# Patient Record
Sex: MALE | Race: WHITE | NOT HISPANIC OR LATINO | Employment: FULL TIME | URBAN - METROPOLITAN AREA
[De-identification: names, ages, dates, MRNs, and addresses within clinical notes are randomized per-mention and may not be internally consistent; named-entity substitution may affect disease eponyms.]

---

## 2017-05-24 ENCOUNTER — TRANSFERRED RECORDS (OUTPATIENT)
Dept: HEALTH INFORMATION MANAGEMENT | Facility: CLINIC | Age: 42
End: 2017-05-24

## 2017-05-24 DIAGNOSIS — G50.0 TRIGEMINAL NEURALGIA: Primary | ICD-10-CM

## 2017-05-24 NOTE — NURSING NOTE
Lab called as patient was there to day to have repeat lab work completed. Faxed order for CBC and CMP. Patient on Trileptal and in need of labs every 3 months.     Demi Saleh RN

## 2017-05-31 ENCOUNTER — TELEPHONE (OUTPATIENT)
Dept: NEUROSURGERY | Facility: CLINIC | Age: 42
End: 2017-05-31

## 2017-05-31 DIAGNOSIS — G50.0 TRIGEMINAL NEURALGIA: Primary | ICD-10-CM

## 2017-05-31 NOTE — TELEPHONE ENCOUNTER
Received patient's lab work from his PCP office. Patient's sodium is a little low at 134. Reviewed with Aggie Bermudez NP and she would like patient to add salt to his diet and repeat labs in 1 month. Message left for patient to let him know of plan and he was encouraged to call with questions or concerns. Faxed lab orders to [facility] and patient was advised to call and arrange appointment. Writer's direct line was left for patient and he was encouraged to call with further questions, concerns or new/worsenings symptoms.     Demi Saleh RN

## 2017-06-30 DIAGNOSIS — G50.0 TRIGEMINAL NEURALGIA: Primary | ICD-10-CM

## 2017-07-07 ENCOUNTER — TRANSFERRED RECORDS (OUTPATIENT)
Dept: HEALTH INFORMATION MANAGEMENT | Facility: CLINIC | Age: 42
End: 2017-07-07

## 2017-07-11 ENCOUNTER — TELEPHONE (OUTPATIENT)
Dept: NEUROSURGERY | Facility: CLINIC | Age: 42
End: 2017-07-11

## 2017-07-11 NOTE — TELEPHONE ENCOUNTER
TELEPHONE FOLLOWUP    I have had a chance to speak with Mr. Kramer today in further followup of his left-sided trigeminal neuralgia (TN) and the recent lab results.     The patient has been taking Trileptal for TN and requires routine monitor of his CMP and CBC. He also has a known history of Carbatrol/ Trileptal induced hyponatremia. His last Na level in 5/2017 was 134. He was asymptomatic from this and we therefore advised him to increase salt intake i.e.adding more salt in foods. The plan was to repeat CMP and CBC in 7/2017. He however did not want to repeat the CBC this time, because of concern of cost. His new Na level as of 7/2017 is 129.    The patient admits today that he has increased Trileptal from 300 mg BID to 600 mg BID since his last lab test in 5/2017 due to recurrent facial pain with triggers (i.e. eating). He was pain free for 6-7 months prior to that. He still has pain with triggers, but not intolerable. He denies n/v, seizures, headache, muscle weakness, cramp, spasm, restlessness, irritability, and confusion except for lack of energy. He felt that his pain is under acceptable control with the medications. He is therefore not interested in pursuing surgical intervention at this time.    We had a terrance discussion about decreasing his Trileptal down to 300 mg BID due to hyponatremia, a common side effect from Trileptal. He is already taking Neurontin 900 mg BID. I will re-adjust his medication schedule with the decrease in Trileptal. I would like to repeat his CMP and CBC in 3-4 weeks. I told him that if the pain is not under control and given his hx of recurrent Trileptal induced hyponatremia, we may need to discontinue Trileptal and try increasing Neurontin instead. He wants to know if there are other medications to take. I told him that since he did not respond to Amitriptyline in the past, we are limited in medical option.  I informed him that Trileptal and Tegretol are typically the first  line drug of treatment for TN. Alternatively, surgery is another good option.    We have not seen him since 10/2016. I would like to see him back in 10/2017 for a follow up if is to continue to have our office manage his TN. He agreed. I  will have our RN coordinator send him the medication schedule and follow up on his status in a few days over the phone. I also told him not to hesitate to call us if he has concerns or questions.

## 2017-07-11 NOTE — PATIENT INSTRUCTIONS
1. Go to local lab for a set of CMP and CBC in 3-4 weeks. Please coordinate with our clinic RN to have this done locall.  2. Follow the new medication schedule. Call our office if you facial pain is not under control  [facility]                                             Time  Drug 6 am 10 am 2 pm 6 pm 10 pm   Oxycarbazepine (Trileptal)  150 mg/pill  2  2    Gabapentin (Neurontin) 300 mg/pill 2  2  2     3. Schedule a follow up appointment with CONRADO Bermudez at the end of 10/2017.

## 2017-07-21 ENCOUNTER — TELEPHONE (OUTPATIENT)
Dept: NEUROSURGERY | Facility: CLINIC | Age: 42
End: 2017-07-21

## 2017-07-21 NOTE — TELEPHONE ENCOUNTER
Called patient to check in to see how he is doing with his new medication schedule given to him by Jeffrey Bermudez DNP. Patient states that he is doing well. He continues to not have any facial pain. He will follow-up with PCP in 2 weeks for repeat lab work. Patient was advised to call with further questions or concerns. He will follow-up with Aggie Bermudez DNP in October of 2017.  Message sent to schedulers to call patient to arrange.     Demi Saleh RN

## 2017-08-10 ENCOUNTER — TRANSFERRED RECORDS (OUTPATIENT)
Dept: HEALTH INFORMATION MANAGEMENT | Facility: CLINIC | Age: 42
End: 2017-08-10

## 2017-08-11 ENCOUNTER — TELEPHONE (OUTPATIENT)
Dept: NEUROSURGERY | Facility: CLINIC | Age: 42
End: 2017-08-11

## 2017-08-11 NOTE — TELEPHONE ENCOUNTER
Called patient to let him know that we received his lab work from his PCP office. His sodium is now normal at 137. Patient states that he is doing well with his facial pain with the new plan given to him by Aggie Bermudez DNP. He will plan to follow-up in clinic on 10/18 with Dr. Brooks. He will repeat his lab work that morning prior to his appointment. Patient was encouraged to call sooner with further questions or concerns.       Demi Saleh RN

## 2017-09-28 DIAGNOSIS — G50.0 TRIGEMINAL NEURALGIA: ICD-10-CM

## 2017-09-28 RX ORDER — OXCARBAZEPINE 150 MG/1
300 TABLET, FILM COATED ORAL 2 TIMES DAILY
Qty: 120 TABLET | Refills: 2 | Status: SHIPPED | OUTPATIENT
Start: 2017-09-28 | End: 2017-09-28

## 2017-09-28 RX ORDER — OXCARBAZEPINE 150 MG/1
300 TABLET, FILM COATED ORAL 2 TIMES DAILY
Qty: 120 TABLET | Refills: 2 | Status: SHIPPED | OUTPATIENT
Start: 2017-09-28 | End: 2017-11-29

## 2017-09-28 RX ORDER — GABAPENTIN 300 MG/1
900 CAPSULE ORAL 3 TIMES DAILY
Qty: 810 CAPSULE | Refills: 2 | Status: SHIPPED | OUTPATIENT
Start: 2017-09-28 | End: 2018-04-11

## 2017-10-18 ENCOUNTER — OFFICE VISIT (OUTPATIENT)
Dept: NEUROSURGERY | Facility: CLINIC | Age: 42
End: 2017-10-18

## 2017-10-18 VITALS — HEART RATE: 65 BPM | HEIGHT: 70 IN | SYSTOLIC BLOOD PRESSURE: 121 MMHG | DIASTOLIC BLOOD PRESSURE: 78 MMHG

## 2017-10-18 DIAGNOSIS — G50.0 TRIGEMINAL NEURALGIA: Primary | ICD-10-CM

## 2017-10-18 DIAGNOSIS — G50.0 TRIGEMINAL NEURALGIA: ICD-10-CM

## 2017-10-18 LAB
ALBUMIN SERPL-MCNC: 3.7 G/DL (ref 3.4–5)
ALP SERPL-CCNC: 85 U/L (ref 40–150)
ALT SERPL W P-5'-P-CCNC: 24 U/L (ref 0–70)
ANION GAP SERPL CALCULATED.3IONS-SCNC: 4 MMOL/L (ref 3–14)
AST SERPL W P-5'-P-CCNC: 16 U/L (ref 0–45)
BASOPHILS # BLD AUTO: 0.1 10E9/L (ref 0–0.2)
BASOPHILS NFR BLD AUTO: 0.8 %
BILIRUB SERPL-MCNC: 0.3 MG/DL (ref 0.2–1.3)
BUN SERPL-MCNC: 13 MG/DL (ref 7–30)
CALCIUM SERPL-MCNC: 8.7 MG/DL (ref 8.5–10.1)
CHLORIDE SERPL-SCNC: 101 MMOL/L (ref 94–109)
CO2 SERPL-SCNC: 30 MMOL/L (ref 20–32)
CREAT SERPL-MCNC: 0.95 MG/DL (ref 0.66–1.25)
DIFFERENTIAL METHOD BLD: NORMAL
EOSINOPHIL # BLD AUTO: 0.7 10E9/L (ref 0–0.7)
EOSINOPHIL NFR BLD AUTO: 10.2 %
ERYTHROCYTE [DISTWIDTH] IN BLOOD BY AUTOMATED COUNT: 11.8 % (ref 10–15)
GFR SERPL CREATININE-BSD FRML MDRD: 87 ML/MIN/1.7M2
GLUCOSE SERPL-MCNC: 60 MG/DL (ref 70–99)
HCT VFR BLD AUTO: 47.4 % (ref 40–53)
HGB BLD-MCNC: 16.8 G/DL (ref 13.3–17.7)
IMM GRANULOCYTES # BLD: 0 10E9/L (ref 0–0.4)
IMM GRANULOCYTES NFR BLD: 0.6 %
LYMPHOCYTES # BLD AUTO: 1.4 10E9/L (ref 0.8–5.3)
LYMPHOCYTES NFR BLD AUTO: 22.2 %
MCH RBC QN AUTO: 31.5 PG (ref 26.5–33)
MCHC RBC AUTO-ENTMCNC: 35.4 G/DL (ref 31.5–36.5)
MCV RBC AUTO: 89 FL (ref 78–100)
MONOCYTES # BLD AUTO: 0.6 10E9/L (ref 0–1.3)
MONOCYTES NFR BLD AUTO: 9.1 %
NEUTROPHILS # BLD AUTO: 3.6 10E9/L (ref 1.6–8.3)
NEUTROPHILS NFR BLD AUTO: 57.1 %
NRBC # BLD AUTO: 0 10*3/UL
NRBC BLD AUTO-RTO: 0 /100
PLATELET # BLD AUTO: 289 10E9/L (ref 150–450)
POTASSIUM SERPL-SCNC: 4.9 MMOL/L (ref 3.4–5.3)
PROT SERPL-MCNC: 7.5 G/DL (ref 6.8–8.8)
RBC # BLD AUTO: 5.34 10E12/L (ref 4.4–5.9)
SODIUM SERPL-SCNC: 136 MMOL/L (ref 133–144)
WBC # BLD AUTO: 6.4 10E9/L (ref 4–11)

## 2017-10-18 ASSESSMENT — PAIN SCALES - GENERAL: PAINLEVEL: SEVERE PAIN (7)

## 2017-10-18 NOTE — LETTER
10/18/2017       RE: Hong Kramer  [address] [address] [place] [id number]     Dear Colleague,    Thank you for referring your patient, Hong Kramer, to the [facility] NEUROSURGERY at [facility]. Please see a copy of my visit note below.    NEUROSURGERY PROGRESS NOTE      REASON FOR VISIT:    Chief Complaint   Patient presents with     Facial Pain     Annual follow up of left-sided trigeminal neuralgia        HISTORY OF PRESENT ILLNESS:  Mr. Kramer is a 42 year old male with significant history of left-sided V2 trigeminal neuralgia, which he elected to be manage medically by our service since 2014. He was last seen in our office in October 2016 where his trigeminal neuralgia was under relatively good control with a combination gabapentin and Trileptal. The primary concern however was the Trileptal associated hyponatremia for which we have instructed him to decrease the dosage at times. The patient was doing well until about 2 month ago when he started to have developed intermittent burning and twinges in the left periauricular area and the left chin with occasional electrical shock like attack in the left posterior upper molar whenever he chews, talks, and brushes his left upper teeth. He denies constant background aching pain, trigeminal neuralgia in the V1 distribution and/or in the contralateral side of his face. He said he went to see an endodontist recently, who thought that he might have a problematic root canal that could be repaired, but it was unclear if this was the cause of his pain. He is currently taking gabapentin 1200 mg b.i.d. and Trileptal 300 mg b.i.d. without adequate pain relief. He denies any side effects from medications. He completed routine CBC with differential and CMP prior to the visit today. His sodium is back up to 136 and the remaining CMP and CBC results are normal.    INTERVAL HEALTH HISTORY:  History reviewed. No pertinent past medical history.  "    CURRENT MEDICATIONS:  Current Outpatient Prescriptions   Medication Sig Dispense Refill     gabapentin (NEURONTIN) 300 MG capsule Take 3 capsules (900 mg) by mouth 3 times daily (Patient taking differently: Take 1,200 mg by mouth 2 times daily 4 tabs TID) 810 capsule 2     OXcarbazepine (TRILEPTAL) 150 MG tablet Take 2 tablets (300 mg) by mouth 2 times daily 120 tablet 2     ALLERGIES:  Review of patient's allergies indicates no known allergies.    REVIEW OF SYSTEMS:  10 point ROS of systems including Constitutional, Eyes, Respiratory, Cardiovascular, Gastroenterology, Genitourinary, Integumentary, Musculoskeletal, Psychiatric were all negative except for pertinent positives noted in my HPI.    PHYSICAL EXAMINATION:    Filed Vitals:  /78  Pulse 65  Ht 1.778 m (5' 10\")     Patient Supplied Answers To the  Pain Questionnaire  [facility] Pain -  Patient Entered Questionnaire/Answers 10/18/2017   What number best describes your pain right now:  0 = No pain  to  10 = Worst pain imaginable 1   How would you describe the pain? burning, sharp   Which of the following worsen your pain? -   Which of the following improve or reduce your pain?  -   What number best describes your average pain for the past week:  0 = No pain  to  10 = Worst pain imaginable 3   What number best describes your LOWEST pain in past 24 hours:  0 = No pain  to  10 = Worst pain imaginable 0   What number best describes your WORST pain in past 24 hours:  0 = No pain  to  10 = Worst pain imaginable 4   When is your pain worst? AM   What non-medicine treatments have you already had for your pain? -   Have you tried treating your pain with medication?  Yes   Are you currently taking medications for your pain? Yes     Appearance: This is a well-nourished and well-developed male in no acute distress.  Head and neck: Normocephalic and atraumatic. Neck is supple without thyromegaly or adenopathy.  Lungs: Clear to auscultation.  Cardiovascular: Heart rate " is regular with S1 and S2, no extra heart sound.  Abdomen: Soft, positive bowel sounds ×4 quadrants.  LOC and Cognition:  he is alert and oriented to person, place, and time. He answers questions and follows commands appropriately in fluent speech and normal tone. Memory and fund of knowledge are appropriate for age.  Cranial Nerves:   Smell is intact and symmetric. Pupils 3 mm react briskly and symmetric. Vision is grossly intact. Peripheral fields is intact. Extraocular movements conjugate and full, no ptosis. Bilateral corneal reflex is intact. Masseter and pterygoid muscle strength are normal, Facial sensation and movement are intact and symmetric. Hears finger rubs in both ears. Palate elevates is symmetric. Swallow and voice quality are unremarkable. Shoulders appear symmetric. Shoulder shrugs and SCM's are strong and symmetric. Midline tongue with normal movements.    LABS AND IMAGING:  All laboratory data reviewed  Lab Results   Component Value Date    WBC 6.4 10/18/2017    HGB 16.8 10/18/2017    HCT 47.4 10/18/2017     10/18/2017     10/18/2017    POTASSIUM 4.9 10/18/2017    CHLORIDE 101 10/18/2017    CO2 30 10/18/2017    BUN 13 10/18/2017    CR 0.95 10/18/2017    GLC 60 (L) 10/18/2017    AST 16 10/18/2017    ALT 24 10/18/2017    ALKPHOS 85 10/18/2017    BILITOTAL 0.3 10/18/2017     ASSESSMENT AND RECOMMENDATIONS: This is a 42-year-old male with a history of left-sided V2 and V3 trigeminal neuralgia, which is not under control by medications currently. We briefly reviewed the surgical options with him. He states he is not ready to pursue any surgical intervention at the present time.    We recommend him to increase the Trileptal to 450 mg b.i.d. and continue to keep the same dosage of gabapentin. We will also continue to follow his lab, CMP and CBC with differential, routinely for as well as he remains on Trileptal. He will plan to get this done in [place] where he lives and have the local  lab fax the results to us for review. We will plan to see him back in a year for another follow-up. We told him not to hesitate to call us if he has any concerns and/or questions regarding his medication and trigeminal neuralgia.    Thank you very much for allowing us to participate in the care of this patient. Please do not hesitate to contact us with questions. We will keep you informed of his progress.     The patient was seen in conjunction with Dr. Cedric Brooks. Dr. Brooks review the history, examined the patient, and jointly with the plan of care.     I have personally reviewed the history and images, examined the patient and discussed the findings with Ms. Bermudez and the patient, reviewed and edited Ms. Bermudez's note and agree with the plan of care. - TIFFANY Brooks M.D., Professor  Aggie Bermudez, DNP, APRN, FNP-[facility]   Department of Neurosurgery  Phone: [phone]  Fax: [phone]

## 2017-10-18 NOTE — MR AVS SNAPSHOT
After Visit Summary   10/18/2017    Hong Kramer    MRN: [id number]           Patient Information     Date Of Birth          1975        Visit Information        Provider Department      10/18/2017 9:30 AM Cedric Brooks MD [facility]        Care Instructions    1, Schedule annual followup with Dr. Brooks (10/2018)  2. May increase Trileptal to 450 mg (2.5 pills) twice per day.  2. Needs to do routine labs (Sodium, ALT, AST, Alkaline phosphatase, and WBC at least) every 3 months.  3. Call [phone] for concerns and questions.          Follow-ups after your visit        Follow-up notes from your care team     Return in about 1 year (around 10/18/2018).      Who to contact     Please call your clinic at [phone] to:    Ask questions about your health    Make or cancel appointments    Discuss your medicines    Learn about your test results    Speak to your doctor   If you have compliments or concerns about an experience at your clinic, or if you wish to file a complaint, please contact [facility] Patient Relations at [phone] or email us at [email or web address]         Additional Information About Your Visit        MyChart Information     ZEFR gives you secure access to your electronic health record. If you see a primary care provider, you can also send messages to your care team and make appointments. If you have questions, please call your primary care clinic.  If you do not have a primary care provider, please call [phone] and they will assist you.      ZEFR is an electronic gateway that provides easy, online access to your medical records. With ZEFR, you can request a clinic appointment, read your test results, renew a prescription or communicate with your care team.     To access your existing account, please contact your [facility] or call [phone] for assistance.        Care  "EveryWhere ID     This is your Care EveryWhere ID. This could be used by other organizations to access your [facility] medical records  [id number]        Your Vitals Were     Pulse Height                65 1.778 m (5' 10\")           Blood Pressure from Last 3 Encounters:   10/18/17 121/78   10/26/16 127/83   05/07/14 126/86    Weight from Last 3 Encounters:   10/26/16 93.9 kg (207 lb 1.6 oz)   05/07/14 91.6 kg (202 lb)              Today, you had the following     No orders found for display         Today's Medication Changes          These changes are accurate as of: 10/18/17 10:27 AM.  If you have any questions, ask your nurse or doctor.               These medicines have changed or have updated prescriptions.        Dose/Directions    gabapentin 300 MG capsule   Commonly known as:  NEURONTIN   This may have changed:    - how much to take  - when to take this  - additional instructions   Used for:  Trigeminal neuralgia        Dose:  900 mg   Take 3 capsules (900 mg) by mouth 3 times daily   Quantity:  810 capsule   Refills:  2                Primary Care Provider Fax #    Laureano Kelly [phone]       [facility] [phone] [facility] [address]        Equal Access to Services     STEPHANIE JACKSON AH: Hadii alex Vee, waaxda luqliz, qaybta kaalmada adesohayada, kale rogel. So [facility] [phone].    ATENCIÓN: Si habla español, tiene a falcon disposición servicios gratuitos de asistencia lingüística. Llame al [phone].    We comply with applicable federal civil rights laws and [place] laws. We do not discriminate on the basis of race, color, national origin, age, disability, sex, sexual orientation, or gender identity.            Thank you!     Thank you for choosing [facility] NEUROSURGERY  for your care. Our goal is always to provide you with excellent care. Hearing back from our patients is one way we can continue to improve our services. Please take a " few minutes to complete the written survey that you may receive in the mail after your visit with us. Thank you!             Your Updated Medication List - Protect others around you: Learn how to safely use, store and throw away your medicines at www.disposemymeds.org.          This list is accurate as of: 10/18/17 10:27 AM.  Always use your most recent med list.                   Brand Name Dispense Instructions for use Diagnosis    gabapentin 300 MG capsule    NEURONTIN    810 capsule    Take 3 capsules (900 mg) by mouth 3 times daily    Trigeminal neuralgia       OXcarbazepine 150 MG tablet    TRILEPTAL    120 tablet    Take 2 tablets (300 mg) by mouth 2 times daily    Trigeminal neuralgia

## 2017-10-18 NOTE — PATIENT INSTRUCTIONS
1, Schedule annual followup with Dr. Brooks (10/2018)  2. May increase Trileptal to 450 mg (2.5 pills) twice per day.  2. Needs to do routine labs (Sodium, ALT, AST, Alkaline phosphatase, and WBC at least) every 3 months.  3. Call [phone] for concerns and questions.

## 2017-10-18 NOTE — NURSING NOTE
Chief Complaint   Patient presents with     RECHECK     Yearly follow up/ Trigeminal Neuralgia     Hossein Isaac CMA

## 2017-10-19 NOTE — PROGRESS NOTES
NEUROSURGERY PROGRESS NOTE      REASON FOR VISIT:    Chief Complaint   Patient presents with     Facial Pain     Annual follow up of left-sided trigeminal neuralgia        HISTORY OF PRESENT ILLNESS:  Mr. Kramer is a 42 year old male with significant history of left-sided V2 trigeminal neuralgia, which he elected to be manage medically by our service since 2014. He was last seen in our office in October 2016 where his trigeminal neuralgia was under relatively good control with a combination gabapentin and Trileptal. The primary concern however was the Trileptal associated hyponatremia for which we have instructed him to decrease the dosage at times. The patient was doing well until about 2 month ago when he started to have developed intermittent burning and twinges in the left periauricular area and the left chin with occasional electrical shock like attack in the left posterior upper molar whenever he chews, talks, and brushes his left upper teeth. He denies constant background aching pain, trigeminal neuralgia in the V1 distribution and/or in the contralateral side of his face. He said he went to see an endodontist recently, who thought that he might have a problematic root canal that could be repaired, but it was unclear if this was the cause of his pain. He is currently taking gabapentin 1200 mg b.i.d. and Trileptal 300 mg b.i.d. without adequate pain relief. He denies any side effects from medications. He completed routine CBC with differential and CMP prior to the visit today. His sodium is back up to 136 and the remaining CMP and CBC results are normal.    INTERVAL HEALTH HISTORY:  History reviewed. No pertinent past medical history.     CURRENT MEDICATIONS:  Current Outpatient Prescriptions   Medication Sig Dispense Refill     gabapentin (NEURONTIN) 300 MG capsule Take 3 capsules (900 mg) by mouth 3 times daily (Patient taking differently: Take 1,200 mg by mouth 2 times daily 4 tabs TID) 810 capsule 2      "OXcarbazepine (TRILEPTAL) 150 MG tablet Take 2 tablets (300 mg) by mouth 2 times daily 120 tablet 2     ALLERGIES:  Review of patient's allergies indicates no known allergies.    REVIEW OF SYSTEMS:  10 point ROS of systems including Constitutional, Eyes, Respiratory, Cardiovascular, Gastroenterology, Genitourinary, Integumentary, Musculoskeletal, Psychiatric were all negative except for pertinent positives noted in my HPI.    PHYSICAL EXAMINATION:    Filed Vitals:  /78  Pulse 65  Ht 1.778 m (5' 10\")     Patient Supplied Answers To the  Pain Questionnaire  [facility] Pain -  Patient Entered Questionnaire/Answers 10/18/2017   What number best describes your pain right now:  0 = No pain  to  10 = Worst pain imaginable 1   How would you describe the pain? burning, sharp   Which of the following worsen your pain? -   Which of the following improve or reduce your pain?  -   What number best describes your average pain for the past week:  0 = No pain  to  10 = Worst pain imaginable 3   What number best describes your LOWEST pain in past 24 hours:  0 = No pain  to  10 = Worst pain imaginable 0   What number best describes your WORST pain in past 24 hours:  0 = No pain  to  10 = Worst pain imaginable 4   When is your pain worst? AM   What non-medicine treatments have you already had for your pain? -   Have you tried treating your pain with medication?  Yes   Are you currently taking medications for your pain? Yes     Appearance: This is a well-nourished and well-developed male in no acute distress.  Head and neck: Normocephalic and atraumatic. Neck is supple without thyromegaly or adenopathy.  Lungs: Clear to auscultation.  Cardiovascular: Heart rate is regular with S1 and S2, no extra heart sound.  Abdomen: Soft, positive bowel sounds ×4 quadrants.  LOC and Cognition:  he is alert and oriented to person, place, and time. He answers questions and follows commands appropriately in fluent speech and normal tone. Memory and " fund of knowledge are appropriate for age.  Cranial Nerves:   Smell is intact and symmetric. Pupils 3 mm react briskly and symmetric. Vision is grossly intact. Peripheral fields is intact. Extraocular movements conjugate and full, no ptosis. Bilateral corneal reflex is intact. Masseter and pterygoid muscle strength are normal, Facial sensation and movement are intact and symmetric. Hears finger rubs in both ears. Palate elevates is symmetric. Swallow and voice quality are unremarkable. Shoulders appear symmetric. Shoulder shrugs and SCM's are strong and symmetric. Midline tongue with normal movements.    LABS AND IMAGING:  All laboratory data reviewed  Lab Results   Component Value Date    WBC 6.4 10/18/2017    HGB 16.8 10/18/2017    HCT 47.4 10/18/2017     10/18/2017     10/18/2017    POTASSIUM 4.9 10/18/2017    CHLORIDE 101 10/18/2017    CO2 30 10/18/2017    BUN 13 10/18/2017    CR 0.95 10/18/2017    GLC 60 (L) 10/18/2017    AST 16 10/18/2017    ALT 24 10/18/2017    ALKPHOS 85 10/18/2017    BILITOTAL 0.3 10/18/2017     ASSESSMENT AND RECOMMENDATIONS: This is a 42-year-old male with a history of left-sided V2 and V3 trigeminal neuralgia, which is not under control by medications currently. We briefly reviewed the surgical options with him. He states he is not ready to pursue any surgical intervention at the present time.    We recommend him to increase the Trileptal to 450 mg b.i.d. and continue to keep the same dosage of gabapentin. We will also continue to monitor his lab, CMP and CBC with differential, routinely for as well as he remains on Trileptal. He will plan to get this done in [place] where he lives and have the local lab fax the results to us for review. We will plan to see him back in a year for another follow-up. We told him not to hesitate to call us if he has any concerns and/or questions regarding his medication and trigeminal neuralgia.    Thank you very much for allowing us to  participate in the care of this patient. Please do not hesitate to contact us with questions. We will keep you informed of his progress.     The patient was seen in conjunction with Dr. Cedric Brooks. Dr. Brooks review the history, examined the patient, and jointly with the plan of care.     I have personally reviewed the history and images, examined the patient and discussed the findings with Ms. Bermudez and the patient, reviewed and edited Ms. Bermudez's note and agree with the plan of care. - [facility]    Cedric Brooks M.D., Professor  Aggie Bermudez, DNP, APRN, FNP-[facility]   Department of Neurosurgery  Phone: [phone]  Fax: [phone]

## 2017-11-29 DIAGNOSIS — G50.0 TRIGEMINAL NEURALGIA: ICD-10-CM

## 2017-11-29 RX ORDER — OXCARBAZEPINE 150 MG/1
TABLET, FILM COATED ORAL
Qty: 150 TABLET | Refills: 1 | Status: SHIPPED | OUTPATIENT
Start: 2017-11-29 | End: 2017-12-21

## 2017-11-29 NOTE — TELEPHONE ENCOUNTER
Pt called b/c trileptal dose was increased from 2 tabs twice a day to 2.5 twice a day at his last clinic appt but the prescription does not reflect the increase. He will run out of medication sooner b/c of the increase. Spoke with Aggie Bermudez and prescription rewritten and sent electronically to [facility] per pt request.

## 2017-12-21 ENCOUNTER — TRANSFERRED RECORDS (OUTPATIENT)
Dept: HEALTH INFORMATION MANAGEMENT | Facility: CLINIC | Age: 42
End: 2017-12-21

## 2017-12-21 DIAGNOSIS — R51.9 FACIAL PAIN: Primary | ICD-10-CM

## 2017-12-21 DIAGNOSIS — G50.0 TRIGEMINAL NEURALGIA: ICD-10-CM

## 2017-12-21 RX ORDER — OXCARBAZEPINE 150 MG/1
TABLET, FILM COATED ORAL
Qty: 720 TABLET | Refills: 0 | Status: SHIPPED | OUTPATIENT
Start: 2017-12-21 | End: 2018-02-27

## 2018-01-03 ENCOUNTER — TELEPHONE (OUTPATIENT)
Dept: NEUROSURGERY | Facility: CLINIC | Age: 43
End: 2018-01-03

## 2018-01-03 NOTE — TELEPHONE ENCOUNTER
Hong continues to have sever facial pain despite large doses of trileptal (1200 mg/day).  He is currently on gabapentin 2400 mg/day - I have instructed him to increase his gabapentin to 3600 mg/day by adding a midday dose.  We will bring him back to see Dr Brooks to discuss the possibillity of surgical intervention

## 2018-02-16 ENCOUNTER — TELEPHONE (OUTPATIENT)
Dept: NEUROSURGERY | Facility: CLINIC | Age: 43
End: 2018-02-16

## 2018-02-16 DIAGNOSIS — G50.0 TRIGEMINAL NEURALGIA: ICD-10-CM

## 2018-02-16 NOTE — TELEPHONE ENCOUNTER
Patient calling reporting diagnosed with Influenza A and on [place] flu medication by PCP.  Pt. States having some flare up of Lt facial discomfort around ear and back of mouth area.  Pt states taking the following medications for TN:  Gabapentin 300mg 3 tablets AM and PM  Trileptal 150mg 4 tablets AM and PM  Reviewed in detail with patient.     Explained the need for Office appointment to discuss his TN treatment options as medication is not holding, break through discomfort.      Patient voices understanding, Scheduling will call patient for appointment.  Pt request mobile phone number.    Information sent to .    2/27/18 1100  Spoke with patient, schedule with Dr. Brooks on 3/14 @ 9am verified with patient.  Patient states Trileptal refill needed, patient states one day left.    Refilled for 4 weeks only, will have lab work with appointment on 3/14/18.  Patient voices understanding the importance of keeping appointment with Dr. Brooks on 3/14.

## 2018-02-27 DIAGNOSIS — G50.0 TRIGEMINAL NEURALGIA: Primary | ICD-10-CM

## 2018-02-27 RX ORDER — OXCARBAZEPINE 150 MG/1
TABLET, FILM COATED ORAL
Qty: 240 TABLET | Refills: 0 | Status: SHIPPED | OUTPATIENT
Start: 2018-02-27 | End: 2018-04-11

## 2018-04-11 ENCOUNTER — OFFICE VISIT (OUTPATIENT)
Dept: NEUROSURGERY | Facility: CLINIC | Age: 43
End: 2018-04-11
Payer: COMMERCIAL

## 2018-04-11 VITALS
BODY MASS INDEX: 28.2 KG/M2 | SYSTOLIC BLOOD PRESSURE: 130 MMHG | WEIGHT: 197 LBS | DIASTOLIC BLOOD PRESSURE: 86 MMHG | HEIGHT: 70 IN | HEART RATE: 6 BPM

## 2018-04-11 DIAGNOSIS — G50.0 TRIGEMINAL NEURALGIA: ICD-10-CM

## 2018-04-11 DIAGNOSIS — G50.0 TRIGEMINAL NEURALGIA: Primary | ICD-10-CM

## 2018-04-11 LAB
ALBUMIN SERPL-MCNC: 3.9 G/DL (ref 3.4–5)
ALP SERPL-CCNC: 82 U/L (ref 40–150)
ALT SERPL W P-5'-P-CCNC: 18 U/L (ref 0–70)
ANION GAP SERPL CALCULATED.3IONS-SCNC: 5 MMOL/L (ref 3–14)
AST SERPL W P-5'-P-CCNC: 14 U/L (ref 0–45)
BASOPHILS # BLD AUTO: 0.1 10E9/L (ref 0–0.2)
BASOPHILS NFR BLD AUTO: 0.9 %
BILIRUB SERPL-MCNC: 0.2 MG/DL (ref 0.2–1.3)
BUN SERPL-MCNC: 12 MG/DL (ref 7–30)
CALCIUM SERPL-MCNC: 9.1 MG/DL (ref 8.5–10.1)
CHLORIDE SERPL-SCNC: 97 MMOL/L (ref 94–109)
CO2 SERPL-SCNC: 29 MMOL/L (ref 20–32)
CREAT SERPL-MCNC: 0.81 MG/DL (ref 0.66–1.25)
DIFFERENTIAL METHOD BLD: NORMAL
EOSINOPHIL # BLD AUTO: 0.6 10E9/L (ref 0–0.7)
EOSINOPHIL NFR BLD AUTO: 7.8 %
ERYTHROCYTE [DISTWIDTH] IN BLOOD BY AUTOMATED COUNT: 11.9 % (ref 10–15)
GFR SERPL CREATININE-BSD FRML MDRD: >90 ML/MIN/1.7M2
GLUCOSE SERPL-MCNC: 100 MG/DL (ref 70–99)
HCT VFR BLD AUTO: 45.4 % (ref 40–53)
HGB BLD-MCNC: 16.2 G/DL (ref 13.3–17.7)
IMM GRANULOCYTES # BLD: 0.1 10E9/L (ref 0–0.4)
IMM GRANULOCYTES NFR BLD: 0.7 %
LYMPHOCYTES # BLD AUTO: 1.5 10E9/L (ref 0.8–5.3)
LYMPHOCYTES NFR BLD AUTO: 21.9 %
MCH RBC QN AUTO: 31.5 PG (ref 26.5–33)
MCHC RBC AUTO-ENTMCNC: 35.7 G/DL (ref 31.5–36.5)
MCV RBC AUTO: 88 FL (ref 78–100)
MONOCYTES # BLD AUTO: 0.6 10E9/L (ref 0–1.3)
MONOCYTES NFR BLD AUTO: 8.4 %
NEUTROPHILS # BLD AUTO: 4.3 10E9/L (ref 1.6–8.3)
NEUTROPHILS NFR BLD AUTO: 60.3 %
NRBC # BLD AUTO: 0 10*3/UL
NRBC BLD AUTO-RTO: 0 /100
PLATELET # BLD AUTO: 263 10E9/L (ref 150–450)
POTASSIUM SERPL-SCNC: 5 MMOL/L (ref 3.4–5.3)
PROT SERPL-MCNC: 7.5 G/DL (ref 6.8–8.8)
RBC # BLD AUTO: 5.14 10E12/L (ref 4.4–5.9)
SODIUM SERPL-SCNC: 131 MMOL/L (ref 133–144)
WBC # BLD AUTO: 7 10E9/L (ref 4–11)

## 2018-04-11 RX ORDER — GABAPENTIN 300 MG/1
1200 CAPSULE ORAL 2 TIMES DAILY
Qty: 240 CAPSULE | Refills: 2 | Status: SHIPPED | OUTPATIENT
Start: 2018-04-11 | End: 2018-07-27

## 2018-04-11 RX ORDER — OXCARBAZEPINE 150 MG/1
TABLET, FILM COATED ORAL
Qty: 240 TABLET | Refills: 0 | Status: SHIPPED | OUTPATIENT
Start: 2018-04-11 | End: 2018-05-23

## 2018-04-11 ASSESSMENT — PAIN SCALES - GENERAL: PAINLEVEL: NO PAIN (0)

## 2018-04-11 NOTE — PATIENT INSTRUCTIONS
1.  Lab: CMP and CBC every 3 months, Lab work completed today 4/11/18.  2.  Gabapentin and Trileptal refilled for 90 days.   3.  Gabapentin 300mg tablet take 4 tablets at 7am and 3pm,  4.  Try tapering Trileptal 150mg tablet 3 tablets (450mg) at 11am and 7PM.        Currently taking 600mg BID.  5.  Call Jody, Care Coordinator  when lab work completed, new refills needed or if symptoms worsen.    6.  AVS with future Lab orders CMP and CBC mailed to patient per patient request.  7.  Follow up with Dr. Brooks as needed.      Thank you for choosing [facility] for your healthcare needs.

## 2018-04-11 NOTE — LETTER
4/11/2018       RE: Hong Kramer  [address] [address] [place] [id number]     Dear Colleague,    Thank you for referring your patient, Hong Kramer, to the [facility] NEUROSURGERY at [facility]. Please see a copy of my visit note below.    Service Date: 04/11/2018      Mr. Kramer and his wife return to discuss the continuing management of his trigeminal neuralgia.      He has a well-established diagnosis of trigeminal neuralgia and has been managed first by Ophelia Wells and recently with Aggie Bermudez for his trigeminal neuralgia medications.  He has had problems with side effects, particularly hyponatremia.      About a month ago he had a flareup related to an influenza infection and the pain was out of control but is now back under control.      We discussed again that at his young age the highest chance of getting long-term relief from his trigeminal neuralgia would be a microvascular decompression.  We talked about the procedure at some length including the small risks of serious complications such as stroke, coma and death.  We talked about an approximately 67-70% lifetime relief of the trigeminal neuralgia following that procedure and then we talked about the other procedures that are available to get temporary control over the pain.      He is increasingly willing to consider moving to a surgical procedure, but finds that now that the pain has settled down again and he is at a very busy time at work, he wants to postpone consideration of a surgical procedure until fall of winter.      Therefore, we are going to resume his medication management.  He has been reliable about getting of quarterly lab testing and once the lab tests are in, we will renew his prescriptions for 3 months.      We have recommended that he call in the fall and set up time to consider an operation and if he has made up his mind have an operation, we can arrange to complete the preoperative  evaluation and operation in a single visit.         D: 2018   T: 2018   MT: JENNIFER      Name:     MARISSA HUNT   MRN:      [id number]        Account:      [id number]   :      1975           Service Date: 2018      Document: [id number]        Again, thank you for allowing me to participate in the care of your patient.      Sincerely,    Cedric Brooks MD

## 2018-04-11 NOTE — MR AVS SNAPSHOT
After Visit Summary   4/11/2018    Hong Kramer    MRN: [id number]           Patient Information     Date Of Birth          1975        Visit Information        Provider Department      4/11/2018 9:30 AM Cedric Brooks MD [facility] Neurosurgery        Today's Diagnoses     Trigeminal neuralgia          Care Instructions    1.  Lab: CMP and CBC every 3 months, Lab work completed today 4/11/18.  2.  Gabapentin and Trileptal refilled for 90 days.   3.  Gabapentin 300mg tablet take 4 tablets at 7am and 3pm,  4.  Try tapering Trileptal 150mg tablet 3 tablets (450mg) at 11am and 7PM.        Currently taking 600mg BID.  5.  Call Jody Care Coordinator  when lab work completed, new refills needed or if symptoms worsen.    6.  AVS with future Lab orders CMP and CBC mailed to patient per patient request.  7.  Follow up with Dr. Brooks as needed.      Thank you for choosing [facility] for your healthcare needs.              Follow-ups after your visit        Follow-up notes from your care team     Return if symptoms worsen or fail to improve.      Future tests that were ordered for you today     Open Future Orders        Priority Expected Expires Ordered    Comprehensive metabolic panel Routine  4/11/2019 4/11/2018    CBC with platelets differential Routine  4/11/2019 4/11/2018            Who to contact     Please call your clinic at [phone] to:    Ask questions about your health    Make or cancel appointments    Discuss your medicines    Learn about your test results    Speak to your doctor            Additional Information About Your Visit        MyChart Information     [employer] gives you secure access to your electronic health record. If you see a primary care provider, you can also send messages to your care team and make appointments. If you have questions, please call your primary care clinic.  If you do not have a primary care provider, please call [phone] and they will  "assist you.      [employer] is an electronic gateway that provides easy, online access to your medical records. With [employer], you can request a clinic appointment, read your test results, renew a prescription or communicate with your care team.     To access your existing account, please contact your [facility] or call [phone] for assistance.        Care EveryWhere ID     This is your Care EveryWhere ID. This could be used by other organizations to access your [facility] medical records  [id number]        Your Vitals Were     Pulse Height BMI (Body Mass Index)             6 1.778 m (5' 10\") 28.27 kg/m2          Blood Pressure from Last 3 Encounters:   04/11/18 130/86   10/18/17 121/78   10/26/16 127/83    Weight from Last 3 Encounters:   04/11/18 89.4 kg (197 lb)   10/26/16 93.9 kg (207 lb 1.6 oz)   05/07/14 91.6 kg (202 lb)                 Today's Medication Changes          These changes are accurate as of 4/11/18 12:47 PM.  If you have any questions, ask your nurse or doctor.               These medicines have changed or have updated prescriptions.        Dose/Directions    gabapentin 300 MG capsule   Commonly known as:  NEURONTIN   This may have changed:    - how much to take  - when to take this  - additional instructions   Used for:  Trigeminal neuralgia   Changed by:  Cedric Brooks MD        Dose:  1200 mg   Take 4 capsules (1,200 mg) by mouth 2 times daily 4 tabs BID   Quantity:  240 capsule   Refills:  2            Where to get your medicines      These medications were sent to [facility]  [facility] - [address]  [address] [facility] [place] [address]    Hours:  test rx sent 6/16/05  Phone:  [phone]     gabapentin 300 MG capsule    OXcarbazepine 150 MG tablet                Primary Care Provider Fax #    Laureano Kelly [phone]       [facility] [address] [facility] [address]        Equal Access to Services     STEPHANIE JACKSON AH: Hadii alex fuller " wil Vee, jovanna manzofaizaha, qaledata kapaolo radhajerel, klae rogelioin hayaascooby garciasoha yudelkalizzy laChicoleonard pebbles. So [facility] [phone].    ATENCIÓN: Si habla español, tiene a falcon disposición servicios gratuitos de asistencia lingüística. Usman al [phone].    We comply with applicable federal civil rights laws and [place] laws. We do not discriminate on the basis of race, color, national origin, age, disability, sex, sexual orientation, or gender identity.            Thank you!     Thank you for choosing [facility] NEUROSURGERY  for your care. Our goal is always to provide you with excellent care. Hearing back from our patients is one way we can continue to improve our services. Please take a few minutes to complete the written survey that you may receive in the mail after your visit with us. Thank you!             Your Updated Medication List - Protect others around you: Learn how to safely use, store and throw away your medicines at www.disposemymeds.org.          This list is accurate as of 4/11/18 12:47 PM.  Always use your most recent med list.                   Brand Name Dispense Instructions for use Diagnosis    gabapentin 300 MG capsule    NEURONTIN    240 capsule    Take 4 capsules (1,200 mg) by mouth 2 times daily 4 tabs BID    Trigeminal neuralgia       OXcarbazepine 150 MG tablet    TRILEPTAL    240 tablet    Take 4 tablets (600 mg) twice a day    Trigeminal neuralgia

## 2018-04-12 LAB — 10OH-CARBAZEPINE SERPL-MCNC: 14.9 UG/ML

## 2018-04-12 NOTE — PROGRESS NOTES
Service Date: 2018      Mr. Kramer and his wife return to discuss the continuing management of his trigeminal neuralgia.      He has a well-established diagnosis of trigeminal neuralgia and has been managed first by Ophelia Wells and recently with Aggie Bermudez for his trigeminal neuralgia medications.  He has had problems with side effects, particularly hyponatremia.      About a month ago he had a flareup related to an influenza infection and the pain was out of control but is now back under control.      We discussed again that at his young age the highest chance of getting long-term relief from his trigeminal neuralgia would be a microvascular decompression.  We talked about the procedure at some length including the small risks of serious complications such as stroke, coma and death.  We talked about an approximately 67-70% lifetime relief of the trigeminal neuralgia following that procedure and then we talked about the other procedures that are available to get temporary control over the pain.      He is increasingly willing to consider moving to a surgical procedure, but finds that now that the pain has settled down again and he is at a very busy time at work, he wants to postpone consideration of a surgical procedure until  of winter.      Therefore, we are going to resume his medication management.  He has been reliable about getting of quarterly lab testing and once the lab tests are in, we will renew his prescriptions for 3 months.      We have recommended that he call in the fall and set up time to consider an operation and if he has made up his mind have an operation, we can arrange to complete the preoperative evaluation and operation in a single visit.         VERO ZAFAR MD             D: 2018   T: 2018   MT: JENNIFER      Name:     MARISSA KRAMER   MRN:      [id number]        Account:      [id number]   :      1975           Service Date: 2018       Document: [id number]

## 2018-04-29 ENCOUNTER — NURSE TRIAGE (OUTPATIENT)
Dept: NURSING | Facility: CLINIC | Age: 43
End: 2018-04-29

## 2018-04-29 NOTE — TELEPHONE ENCOUNTER
Clinic Action Needed:No    Reason for Call: oHng and his wife Amy calling (Hong gave me permission to speak to Amy).  They were seen at the [facility] on April 11th and two scripts for medications were written and sent to [facility].  They just realized last night that when they picked up medications they only received the Gabapentin, they did not receive theTrileptal which was also ordered. Their local pharmacy is closed on Sunday. Hong takes 4 pills of the Trileptal twice daily and he did not have enough for evening dose last night.  They are requesting that a script be transferred or sent to [facility].  I advised that I'm not able to send to an out of state pharmacy and also advised that if this Rx was filled at their home town pharmacy it may have already been run through their insurance.  Paged on call provider for [facility] Neurosurgery to speak to caller at [phone].  Dr. Manley is on call, page sent @ 9:46 am via [facility] page . Caller advised to call back if they have not heard back from on call provider within 20 minutes.  Caller appears to understand directives and agrees with plan.    Routed to: Not routed.    Margaux Horowitz, RN  [facility]

## 2018-05-23 DIAGNOSIS — G50.0 TRIGEMINAL NEURALGIA: ICD-10-CM

## 2018-05-23 RX ORDER — OXCARBAZEPINE 150 MG/1
TABLET, FILM COATED ORAL
Qty: 240 TABLET | Refills: 1 | Status: SHIPPED | OUTPATIENT
Start: 2018-05-23 | End: 2018-07-27

## 2018-07-27 ENCOUNTER — TRANSFERRED RECORDS (OUTPATIENT)
Dept: HEALTH INFORMATION MANAGEMENT | Facility: CLINIC | Age: 43
End: 2018-07-27

## 2018-07-27 ENCOUNTER — TELEPHONE (OUTPATIENT)
Dept: NEUROSURGERY | Facility: CLINIC | Age: 43
End: 2018-07-27

## 2018-07-27 DIAGNOSIS — G50.0 TRIGEMINAL NEURALGIA: Primary | ICD-10-CM

## 2018-07-27 RX ORDER — GABAPENTIN 300 MG/1
1200 CAPSULE ORAL 2 TIMES DAILY
Qty: 240 CAPSULE | Refills: 0 | Status: SHIPPED | OUTPATIENT
Start: 2018-07-27 | End: 2018-09-17

## 2018-07-27 RX ORDER — OXCARBAZEPINE 150 MG/1
TABLET, FILM COATED ORAL
Qty: 240 TABLET | Refills: 0 | Status: SHIPPED | OUTPATIENT
Start: 2018-07-27 | End: 2018-08-28

## 2018-07-27 NOTE — TELEPHONE ENCOUNTER
Touched base with pt.  [facility]  Pt was reminded to take the medications as prescribed on the bottle.

## 2018-07-27 NOTE — TELEPHONE ENCOUNTER
Labs were faxed to the number provided by the pt [phone].  Pt aware and has gone to have his blood drawn.  Writer will follow up with results.

## 2018-07-27 NOTE — TELEPHONE ENCOUNTER
Hong and his wife left a message stating that the pt needed refills of Gabapentin and Trileptal.   The last labs were done on April 11, 2018.  Everything was within normal limits except for sodium (131) and glucose (100).  A note was sent to Aggie Bermudez asking for guidance.  This message was relayed to the pt and will speak with him later in the day.

## 2018-08-09 ENCOUNTER — TELEPHONE (OUTPATIENT)
Dept: NEUROSURGERY | Facility: CLINIC | Age: 43
End: 2018-08-09

## 2018-08-09 NOTE — TELEPHONE ENCOUNTER
Spoke to patient to discuss lab results.  Na low at 127 on 7/27/18.  CBC within therapeutic range.    Pt states taking Trileptal 150mg 4 tablets bid.    Pt states 2x took extra Trileptal when he had discomfort while eating.  Explained Na low that is why only one month medication refilled.    Will touch base with Aggie if CMP needed again prior to a 90 day refill.    Will callback on Monday.  Pt voices understanding.

## 2018-08-27 ENCOUNTER — TELEPHONE (OUTPATIENT)
Dept: NEUROSURGERY | Facility: CLINIC | Age: 43
End: 2018-08-27

## 2018-08-27 ENCOUNTER — TRANSFERRED RECORDS (OUTPATIENT)
Dept: HEALTH INFORMATION MANAGEMENT | Facility: CLINIC | Age: 43
End: 2018-08-27

## 2018-08-27 DIAGNOSIS — G50.0 TRIGEMINAL NEURALGIA: ICD-10-CM

## 2018-08-27 DIAGNOSIS — E87.1 HYPONATREMIA: Primary | ICD-10-CM

## 2018-08-28 ENCOUNTER — PATIENT OUTREACH (OUTPATIENT)
Dept: NEUROSURGERY | Facility: CLINIC | Age: 43
End: 2018-08-28

## 2018-08-28 DIAGNOSIS — G50.0 TRIGEMINAL NEURALGIA: ICD-10-CM

## 2018-08-28 RX ORDER — OXCARBAZEPINE 150 MG/1
TABLET, FILM COATED ORAL
Qty: 240 TABLET | Refills: 2 | Status: SHIPPED | OUTPATIENT
Start: 2018-08-28 | End: 2019-01-02

## 2018-08-28 NOTE — PROGRESS NOTES
Call to [facility] for Na level drawn 8/27/18  Verbal report given from Muriel de jesus on fax  Na level :  131,  therapeutic range 136 - 145 Improved  Na Level on 7/30/18 121.    Refilled Oxcarbazepine 150mg takes 4 tablets twice daily at [facility] per patient request.    Callback to patient, left message on voice mail with test results and refill completed.  Next Lab work due 11/28/18

## 2018-09-17 DIAGNOSIS — G50.0 TRIGEMINAL NEURALGIA: ICD-10-CM

## 2018-09-17 RX ORDER — GABAPENTIN 300 MG/1
1200 CAPSULE ORAL 2 TIMES DAILY
Qty: 720 CAPSULE | Refills: 0 | Status: SHIPPED | OUTPATIENT
Start: 2018-09-17 | End: 2018-10-04

## 2018-10-04 ENCOUNTER — PATIENT OUTREACH (OUTPATIENT)
Dept: NEUROSURGERY | Facility: CLINIC | Age: 43
End: 2018-10-04

## 2018-10-04 DIAGNOSIS — G50.0 TRIGEMINAL NEURALGIA: Primary | ICD-10-CM

## 2018-10-04 RX ORDER — GABAPENTIN 600 MG/1
TABLET ORAL
Qty: 360 TABLET | Refills: 0 | Status: SHIPPED | OUTPATIENT
Start: 2018-10-04 | End: 2019-04-02

## 2018-10-04 NOTE — PROGRESS NOTES
Patient calling stating Insurance letter received stating Maximum number of Gabapentin 300 mg capsules per month is 90 starting 10/1.  Pt is taking Gabapentin 300mg 4 caps AM and PM for a total 8 capsules per day, 240 per month or 1200mg BID.    Explained patient will need to change to a 600mg capsule of Gabapentin.  Pt requested [facility].  RX changed to Gabapentin 600mg take 2 tablets AM and 2 tablets PM, or 1200mg BID.    Pt voices understanding, new RX completed.

## 2018-11-06 ENCOUNTER — PATIENT OUTREACH (OUTPATIENT)
Dept: NEUROSURGERY | Facility: CLINIC | Age: 43
End: 2018-11-06

## 2018-11-06 NOTE — PROGRESS NOTES
Patient calling asking if a yearly visit with RISHI Bermudez is necessary for pharmaceutical management of TN.    Pt has been able to taper off his medications some:  Gabapentin 300mg lowered to BID, instead of QID  Trileptal 150mg  Lowered to 2 tabs BID instead of 2 tabs QID.    Will research with RISHI Bermudez and get back to patient.     Callback to patient, left message, yes RISHI Bermudez would like to see him yearly.  A note will be sent to  to call pt for appointment.  Callback for questions/concerns

## 2018-11-30 ENCOUNTER — OFFICE VISIT (OUTPATIENT)
Dept: NEUROSURGERY | Facility: CLINIC | Age: 43
End: 2018-11-30
Payer: COMMERCIAL

## 2018-11-30 VITALS
OXYGEN SATURATION: 100 % | HEART RATE: 65 BPM | HEIGHT: 70 IN | SYSTOLIC BLOOD PRESSURE: 120 MMHG | TEMPERATURE: 96.1 F | BODY MASS INDEX: 27.63 KG/M2 | WEIGHT: 193 LBS | DIASTOLIC BLOOD PRESSURE: 70 MMHG

## 2018-11-30 DIAGNOSIS — G50.0 TRIGEMINAL NEURALGIA: ICD-10-CM

## 2018-11-30 DIAGNOSIS — R51.9 FACIAL PAIN: ICD-10-CM

## 2018-11-30 DIAGNOSIS — E87.1 HYPONATREMIA: ICD-10-CM

## 2018-11-30 DIAGNOSIS — G50.0 TRIGEMINAL NEURALGIA: Primary | ICD-10-CM

## 2018-11-30 LAB
ALBUMIN SERPL-MCNC: 3.7 G/DL (ref 3.4–5)
ALP SERPL-CCNC: 83 U/L (ref 40–150)
ALT SERPL W P-5'-P-CCNC: 27 U/L (ref 0–70)
ANION GAP SERPL CALCULATED.3IONS-SCNC: 7 MMOL/L (ref 3–14)
AST SERPL W P-5'-P-CCNC: 17 U/L (ref 0–45)
BILIRUB SERPL-MCNC: 0.3 MG/DL (ref 0.2–1.3)
BUN SERPL-MCNC: 14 MG/DL (ref 7–30)
CALCIUM SERPL-MCNC: 8.4 MG/DL (ref 8.5–10.1)
CHLORIDE SERPL-SCNC: 97 MMOL/L (ref 94–109)
CO2 SERPL-SCNC: 28 MMOL/L (ref 20–32)
CREAT SERPL-MCNC: 0.86 MG/DL (ref 0.66–1.25)
ERYTHROCYTE [DISTWIDTH] IN BLOOD BY AUTOMATED COUNT: 11.7 % (ref 10–15)
GFR SERPL CREATININE-BSD FRML MDRD: >90 ML/MIN/1.7M2
GLUCOSE SERPL-MCNC: 74 MG/DL (ref 70–99)
HCT VFR BLD AUTO: 43.1 % (ref 40–53)
HGB BLD-MCNC: 15.1 G/DL (ref 13.3–17.7)
MCH RBC QN AUTO: 30.6 PG (ref 26.5–33)
MCHC RBC AUTO-ENTMCNC: 35 G/DL (ref 31.5–36.5)
MCV RBC AUTO: 87 FL (ref 78–100)
PLATELET # BLD AUTO: 297 10E9/L (ref 150–450)
POTASSIUM SERPL-SCNC: 3.8 MMOL/L (ref 3.4–5.3)
PROT SERPL-MCNC: 7.3 G/DL (ref 6.8–8.8)
RBC # BLD AUTO: 4.94 10E12/L (ref 4.4–5.9)
SODIUM SERPL-SCNC: 132 MMOL/L (ref 133–144)
WBC # BLD AUTO: 6.9 10E9/L (ref 4–11)

## 2018-11-30 ASSESSMENT — PAIN SCALES - GENERAL: PAINLEVEL: NO PAIN (0)

## 2018-11-30 NOTE — PATIENT INSTRUCTIONS
A. Take the Neurontin and Trileptal base on the following schedule:       5 am   10 am    3pm    8 pm     Trileptal 300 mg   300 mg   Gabapentin    300 mg   300 mg    B. Continue to go for lab tests every 3 months.    C. Follow up with RISHI Bermudez in 1 year.    D. Call [phone] for concerns and questions.

## 2018-11-30 NOTE — LETTER
11/30/2018       RE: Hong Kramer  [address] [address] [place] [address]     Dear Colleague,    Thank you for referring your patient, Hong Kramer, to the [facility] NEUROSURGERY at [facility]. Please see a copy of my visit note below.    NEUROSURGERY PROGRESS NOTE    I saw Mr. Kramer in our office for follow up of his left sided trigeminal neuralgia.    In summary, this is a 42 year old male with a history of left-sided V2 trigeminal neuralgia for which he elected to be manage medically by our service since 2014. He was last seen by Dr. Brooks in 2018 where his trigeminal neuralgia was under relatively good control with a combination gabapentin and Trileptal. The primary concern however was the Trileptal associated hyponatremia for which we have instructed him to decrease the dosage from time to time. Dr. Brooks reviewed various surgical options and he again decided to remain on medications for the time being.     The patient states that he was doing well until about 2 months ago when he developed intermittent burns and twinges in the left cheek with occasional electrical shock like pain attacks. The pain occurred only if he touched the front left lower tooth or whenever he chews, talks, and brushes his left upper teeth. He denies constant background aching pain, trigeminal neuralgia in the V1 distribution and/or in the contralateral side of his face. He said he went to see an endodontist recently, who thought that he might have a problematic root canal that could be repaired, but it was unclear if this was the cause of his pain. The pain subsided after he had increased the Trileptal to 1200 mg per day in combination with Neurontin 600 mg per day temporarily. He has already reduced the Trileptal down to 300 mg BID as soon as the pain improved. He denies side effects from Trileptal.     INTERVAL HEALTH HISTORY:  History reviewed. No pertinent past medical history.     CURRENT  "MEDICATIONS:    Current Outpatient Prescriptions   Medication Sig Dispense Refill     gabapentin (NEURONTIN) 600 MG tablet Take 2 tablets, 1200mg,  in the AM and 2 tablets,1200mg,  in the  tablet 0     OXcarbazepine (TRILEPTAL) 150 MG tablet Take 4 tablets (600 mg) twice a day 240 tablet 2       ALLERGIES:  Review of patient's allergies indicates no known allergies.    REVIEW OF SYSTEMS:  10 point ROS of systems including Constitutional, Eyes, Respiratory, Cardiovascular, Gastroenterology, Genitourinary, Integumentary, Musculoskeletal, Psychiatric were all negative except for pertinent positives noted in my HPI.    PHYSICAL EXAMINATION:  Filed Vitals:  /70 (BP Location: Left arm)  Pulse 65  Temp 96.1  F (35.6  C) (Oral)  Ht 1.778 m (5' 10\")  Wt 87.5 kg (193 lb)  SpO2 100%  BMI 27.69 kg/m2   Appearance: This is a well-nourished and well-developed male in no acute distress.  Head and neck: Normocephalic and atraumatic. Neck is supple without thyromegaly or adenopathy.  Lungs: Clear to auscultation.  Cardiovascular: Heart rate is regular with S1 and S2, no extra heart sound.  Abdomen: Soft, positive bowel sounds ×4 quadrants.  LOC and Cognition:  he is alert and oriented to person, place, and time. He answers questions and follows commands appropriately in fluent speech and normal tone. Memory and fund of knowledge are appropriate for age.  Cranial Nerves:   Smell is intact and symmetric. Pupils 3 mm react briskly and symmetric. Vision is grossly intact. Peripheral fields is intact. Extraocular movements conjugate and full, no ptosis. Bilateral corneal reflex is intact. Masseter and pterygoid muscle strength are normal, Facial sensation and movement are intact and symmetric. Hears finger rubs in both ears. Palate elevates is symmetric. Swallow and voice quality are unremarkable. Shoulders appear symmetric. Shoulder shrugs and SCM's are strong and symmetric. Midline tongue with normal " movements.    LABS AND IMAGING:  All laboratory data reviewed  Lab Results   Component Value Date    WBC 6.9 11/30/2018    HGB 15.1 11/30/2018    HCT 43.1 11/30/2018     11/30/2018     (L) 11/30/2018    POTASSIUM 3.8 11/30/2018    CHLORIDE 97 11/30/2018    CO2 28 11/30/2018    BUN 14 11/30/2018    CR 0.86 11/30/2018    GLC 74 11/30/2018    AST 17 11/30/2018    ALT 27 11/30/2018    ALKPHOS 83 11/30/2018    BILITOTAL 0.3 11/30/2018     ASSESSMENT AND RECOMMENDATIONS: This is a 42-year-old male with a history of left-sided V2 and V3 trigeminal neuralgia that is under good control with medications.     He is slightly hyponatremic today, but is asymptomatic (and has never been since he started the Trileptal). He again would like to continue with medical management -Trileptal 300 mg BID and Neurontin 300 mg BID. We will continue to monitor his CMP and CBC with differential routinely for as long as he remains on Trileptal. Given his history of Trileptal associated hyponatremia, I suggest that he try pacing out the medications into 4 times per day (a schedule was given to him) when he develops the next flare up instead of increasing the Trileptal immediately to see this will help.  I again recommend that he may want to reconsider surgical option at some point since he is young and relatively healthy . He has never had surgery and is anxious about this. He is also concerned about taking time off from work. Base on this, we talked about percutaneous radiofrequency rhizotomy, which may be a good option. I reminded him that this is less invasive compared to microvascular decompression (MVD) and it is an outpatient procedure after which he can return to work the next day. The patient was informed that the initial success rate of radiofrequency is about 80-90% with a medium term recurrence rate ranging between 1.5 to 2 years.  Potential complications associated with radiofrequency include, but are not limited to  bleeding, dysesthesia (with 0.2- 4 percent of anesthesia dolorosa), meningitis, alterations in salivation, alterations in lacrimation, masseter muscle weakness, ocular paresis, reduced hearing, neuroparalytic keratitis and possible failure to respond. He also asked a number of good questions which I answered. He is going to give the procedure some thoughts and will let us know when he is ready to pursue this for recurrence in the future.     At this time, I will plan to see him back in a year for another follow-up. I told him not to hesitate to call us if he has any concerns and/or questions regarding his medication and trigeminal neuralgia.    Thank you very much for allowing us to participate in the care of this patient. We will keep you informed of his progress.     > 50% of 60 minutes visit I spent in counseling, education, and care coordination for the problem outlined above      Aggie Bermudez DNP, APRN, FNP-BC  [facility]   Department of Neurosurgery  Phone: [phone]  Fax: [phone]

## 2018-11-30 NOTE — MR AVS SNAPSHOT
After Visit Summary   11/30/2018    Hong Kramer    MRN: [id number]           Patient Information     Date Of Birth          1975        Visit Information        Provider Department      11/30/2018 3:00 PM Aggie Bermudez APRN CNP [facility] Neurosurgery        Today's Diagnoses     Trigeminal neuralgia    -  1    Hyponatremia          Care Instructions    A. Take the Neurontin and Trileptal base on the following schedule:       5 am   10 am    3pm    8 pm     Trileptal 300 mg   300 mg   Gabapentin    300 mg   300 mg    B. Continue to go for lab tests every 3 months.    C. Follow up with RISHI Bermudez in 1 year.    D. Call [phone] for concerns and questions.           Follow-ups after your visit        Who to contact     Please call your clinic at [phone] to:    Ask questions about your health    Make or cancel appointments    Discuss your medicines    Learn about your test results    Speak to your doctor            Additional Information About Your Visit        [facility] Information     [facility] gives you secure access to your electronic health record. If you see a primary care provider, you can also send messages to your care team and make appointments. If you have questions, please call your primary care clinic.  If you do not have a primary care provider, please call [phone] and they will assist you.      F[facility]eri is an electronic gateway that provides easy, online access to your medical records. With [employer], you can request a clinic appointment, read your test results, renew a prescription or communicate with your care team.     To access your existing account, please contact your [facility] or call [phone] for assistance.        Care EveryWhere ID     This is your Care EveryWhere ID. This could be used by other organizations to access your [facility] medical records  [id number]        Your Vitals Were     Pulse Temperature Height Pulse  "Oximetry BMI (Body Mass Index)       65 96.1  F (35.6  C) (Oral) 1.778 m (5' 10\") 100% 27.69 kg/m2        Blood Pressure from Last 3 Encounters:   11/30/18 120/70   04/11/18 130/86   10/18/17 121/78    Weight from Last 3 Encounters:   11/30/18 87.5 kg (193 lb)   04/11/18 89.4 kg (197 lb)   10/26/16 93.9 kg (207 lb 1.6 oz)              Today, you had the following     No orders found for display       Primary Care Provider Fax #    Laureano Kelly [phone]       [place] [address] [address] [address]        Equal Access to Services     STEPHANIE JACKSON : Hadii alex clayo Sotristen, waaxda luqadaha, qaybta kaalmada adeegyada, kale rogel. So [facility] [phone].    ATENCIÓN: Si habla español, tiene a falcon disposición servicios gratuitos de asistencia lingüística. Llame al [phone].    We comply with applicable federal civil rights laws and [place] laws. We do not discriminate on the basis of race, color, national origin, age, disability, sex, sexual orientation, or gender identity.            Thank you!     Thank you for choosing [facility]  for your care. Our goal is always to provide you with excellent care. Hearing back from our patients is one way we can continue to improve our services. Please take a few minutes to complete the written survey that you may receive in the mail after your visit with us. Thank you!             Your Updated Medication List - Protect others around you: Learn how to safely use, store and throw away your medicines at www.disposemymeds.org.          This list is accurate as of 11/30/18  3:57 PM.  Always use your most recent med list.                   Brand Name Dispense Instructions for use Diagnosis    gabapentin 600 MG tablet    NEURONTIN    360 tablet    Take 2 tablets, 1200mg,  in the AM and 2 tablets,1200mg,  in the PM    Trigeminal neuralgia       OXcarbazepine 150 MG tablet    TRILEPTAL    240 tablet    Take 4 " tablets (600 mg) twice a day    Trigeminal neuralgia

## 2018-12-04 LAB — 10OH-CARBAZEPINE SERPL-MCNC: 10 UG/ML

## 2018-12-05 NOTE — PROGRESS NOTES
NEUROSURGERY PROGRESS NOTE    I saw Mr. Kramer in our office for follow up of his left sided trigeminal neuralgia.    In summary, this is a 42 year old male with a history of left-sided V2 trigeminal neuralgia for which he elected to be manage medically by our service since 2014. He was last seen by Dr. Brooks in 2018 where his trigeminal neuralgia was under relatively good control with a combination gabapentin and Trileptal. The primary concern however was the Trileptal associated hyponatremia for which we have instructed him to decrease the dosage from time to time. Dr. Brooks reviewed various surgical options and he again decided to remain on medications for the time being.     The patient states that he was doing well until about 2 months ago when he developed intermittent burns and twinges in the left cheek with occasional electrical shock like pain attacks. The pain occurred only if he touched the front left lower tooth or whenever he chews, talks, and brushes his left upper teeth. He denies constant background aching pain, trigeminal neuralgia in the V1 distribution and/or in the contralateral side of his face. He said he went to see an endodontist recently, who thought that he might have a problematic root canal that could be repaired, but it was unclear if this was the cause of his pain. The pain subsided after he had increased the Trileptal to 1200 mg per day in combination with Neurontin 600 mg per day temporarily. He has already reduced the Trileptal down to 300 mg BID as soon as the pain improved. He denies side effects from Trileptal.     INTERVAL HEALTH HISTORY:  History reviewed. No pertinent past medical history.     CURRENT MEDICATIONS:    Current Outpatient Prescriptions   Medication Sig Dispense Refill     gabapentin (NEURONTIN) 600 MG tablet Take 2 tablets, 1200mg,  in the AM and 2 tablets,1200mg,  in the  tablet 0     OXcarbazepine (TRILEPTAL) 150 MG tablet Take 4 tablets (600 mg)  "twice a day 240 tablet 2       ALLERGIES:  Review of patient's allergies indicates no known allergies.    REVIEW OF SYSTEMS:  10 point ROS of systems including Constitutional, Eyes, Respiratory, Cardiovascular, Gastroenterology, Genitourinary, Integumentary, Musculoskeletal, Psychiatric were all negative except for pertinent positives noted in my HPI.    PHYSICAL EXAMINATION:  Filed Vitals:  /70 (BP Location: Left arm)  Pulse 65  Temp 96.1  F (35.6  C) (Oral)  Ht 1.778 m (5' 10\")  Wt 87.5 kg (193 lb)  SpO2 100%  BMI 27.69 kg/m2   Appearance: This is a well-nourished and well-developed male in no acute distress.  Head and neck: Normocephalic and atraumatic. Neck is supple without thyromegaly or adenopathy.  Lungs: Clear to auscultation.  Cardiovascular: Heart rate is regular with S1 and S2, no extra heart sound.  Abdomen: Soft, positive bowel sounds ×4 quadrants.  LOC and Cognition:  he is alert and oriented to person, place, and time. He answers questions and follows commands appropriately in fluent speech and normal tone. Memory and fund of knowledge are appropriate for age.  Cranial Nerves:   Smell is intact and symmetric. Pupils 3 mm react briskly and symmetric. Vision is grossly intact. Peripheral fields is intact. Extraocular movements conjugate and full, no ptosis. Bilateral corneal reflex is intact. Masseter and pterygoid muscle strength are normal, Facial sensation and movement are intact and symmetric. Hears finger rubs in both ears. Palate elevates is symmetric. Swallow and voice quality are unremarkable. Shoulders appear symmetric. Shoulder shrugs and SCM's are strong and symmetric. Midline tongue with normal movements.    LABS AND IMAGING:  All laboratory data reviewed  Lab Results   Component Value Date    WBC 6.9 11/30/2018    HGB 15.1 11/30/2018    HCT 43.1 11/30/2018     11/30/2018     (L) 11/30/2018    POTASSIUM 3.8 11/30/2018    CHLORIDE 97 11/30/2018    CO2 28 11/30/2018 "    BUN 14 11/30/2018    CR 0.86 11/30/2018    GLC 74 11/30/2018    AST 17 11/30/2018    ALT 27 11/30/2018    ALKPHOS 83 11/30/2018    BILITOTAL 0.3 11/30/2018     ASSESSMENT AND RECOMMENDATIONS: This is a 42-year-old male with a history of left-sided V2 and V3 trigeminal neuralgia that is under good control with medications.     He is slightly hyponatremic today, but is asymptomatic (and has never been since he started the Trileptal). He again would like to continue with medical management -Trileptal 300 mg BID and Neurontin 300 mg BID. We will continue to monitor his CMP and CBC with differential routinely for as long as he remains on Trileptal. Given his history of Trileptal associated hyponatremia, I suggest that he try pacing out the medications into 4 times per day (a schedule was given to him) when he develops the next flare up instead of increasing the Trileptal immediately to see this will help.  I again recommend that he may want to reconsider surgical option at some point since he is young and relatively healthy . He has never had surgery and is anxious about this. He is also concerned about taking time off from work. Base on this, we talked about percutaneous radiofrequency rhizotomy, which may be a good option. I reminded him that this is less invasive compared to microvascular decompression (MVD) and it is an outpatient procedure after which he can return to work the next day. The patient was informed that the initial success rate of radiofrequency is about 80-90% with a medium term recurrence rate ranging between 1.5 to 2 years.  Potential complications associated with radiofrequency include, but are not limited to bleeding, dysesthesia (with 0.2- 4 percent of anesthesia dolorosa), meningitis, alterations in salivation, alterations in lacrimation, masseter muscle weakness, ocular paresis, reduced hearing, neuroparalytic keratitis and possible failure to respond. He also asked a number of good questions  which I answered. He is going to give the procedure some thoughts and will let us know when he is ready to pursue this for recurrence in the future.     At this time, I will plan to see him back in a year for another follow-up. I told him not to hesitate to call us if he has any concerns and/or questions regarding his medication and trigeminal neuralgia.    Thank you very much for allowing us to participate in the care of this patient. We will keep you informed of his progress.     > 50% of 60 minutes visit I spent in counseling, education, and care coordination for the problem outlined above      Aggie Bermudez, CONRADO, APRN, FNP-BC  [facility]   Department of Neurosurgery  Phone: [phone]  Fax: [phone]

## 2018-12-31 NOTE — TELEPHONE ENCOUNTER
How Severe Is It?: moderate Patient calling for refill of Oxcarbazepine 150mg taking 4 tablets BID.  Lab work completed on 7/27/18  Sodium level on 7/27/18: 127  All other results of CMP, CBC and Oxcarbazepine level within theraputic range.    Call to [facility] to get Lab Fax #.    Sodium lab request faxed to [facility] Lab:  [phone]    Patient aware fax is being sent now.    Will await results, patient uses [email or web address] in system and would like a 90 day RX.           Is This A New Presentation, Or A Follow-Up?: Follow Up Intertrigo

## 2019-01-02 ENCOUNTER — PATIENT OUTREACH (OUTPATIENT)
Dept: NEUROSURGERY | Facility: CLINIC | Age: 44
End: 2019-01-02

## 2019-01-02 DIAGNOSIS — G50.0 TRIGEMINAL NEURALGIA: ICD-10-CM

## 2019-01-02 RX ORDER — OXCARBAZEPINE 150 MG/1
TABLET, FILM COATED ORAL
Qty: 240 TABLET | Refills: 2 | Status: SHIPPED | OUTPATIENT
Start: 2019-01-02 | End: 2019-05-17

## 2019-01-02 NOTE — PROGRESS NOTES
Patient calling asking for refill of Trileptal 600mg BID.  States some days only needs to take 450mg BID.  Lab work completed 11/30/18.  Patient will get lab work again near home end of Feburary or early March.    Medication filled.  Lab Orders faxed to     .

## 2019-04-02 DIAGNOSIS — G50.0 TRIGEMINAL NEURALGIA: ICD-10-CM

## 2019-04-02 RX ORDER — GABAPENTIN 600 MG/1
TABLET ORAL
Qty: 360 TABLET | Refills: 3 | Status: SHIPPED | OUTPATIENT
Start: 2019-04-02 | End: 2019-04-04

## 2019-04-04 ENCOUNTER — TELEPHONE (OUTPATIENT)
Dept: NEUROSURGERY | Facility: CLINIC | Age: 44
End: 2019-04-04

## 2019-04-04 DIAGNOSIS — G50.0 TRIGEMINAL NEURALGIA: ICD-10-CM

## 2019-04-04 RX ORDER — GABAPENTIN 600 MG/1
TABLET ORAL
Qty: 360 TABLET | Refills: 3 | Status: SHIPPED | OUTPATIENT
Start: 2019-04-04 | End: 2019-09-26

## 2019-04-04 NOTE — TELEPHONE ENCOUNTER
"6 month F/U  LOV 11/30/2018 for Left SidedTrigeminal Neuralgia  Spoke with patient, states feeling \"great\" on Trileptal 600mg BID, and Gabapentin 1200mg BID.  States no facial pain and on medication.    Explained due for lab work before next RX refill for Trileptal.  Pt voices understanding and will get lab work prior to running out of Trileptal.    Refill Gabapentin 600mg take 2 tablets BID.  To [facility], Pharmacy called, states did not receive refill on 4/2/19, resent.    Voices understanding.  "

## 2019-04-16 ENCOUNTER — TELEPHONE (OUTPATIENT)
Dept: NEUROSURGERY | Facility: CLINIC | Age: 44
End: 2019-04-16

## 2019-04-16 NOTE — TELEPHONE ENCOUNTER
Outbound call to patient after receiving pharmacy paper fax refill request for Trileptal.  Pt states not needed at this time, reminder for lab work prior to next refill.    Voices understanding.

## 2019-04-29 ENCOUNTER — PATIENT OUTREACH (OUTPATIENT)
Dept: NEUROSURGERY | Facility: CLINIC | Age: 44
End: 2019-04-29

## 2019-04-29 NOTE — PROGRESS NOTES
Patient calling complaining of sore on roof of mouth for past 5-7 days that have been painful and has caused facial pains.  Pt asking if this is shingles?  Explained unknown would need to see PCP, shingles due run along nerves and are painful. Pt states has had this in past, same thing on roof of mouth.  Pt wanting to send picture, states has not set up my chart.  Pt sending picture, but did recommend appointment with PCP to determine diagnosis.

## 2019-04-29 NOTE — PROGRESS NOTES
Callback to patient, NP Aidan agrees patient to see PCP to have mouth sores on roof of mouth diagnosed.  Unable to tell much from picture sent.  Pt states has had them in past and are uncomfortable.  Recommend find out what they are or diagnosed so treatment can be sought.  Voices understanding.

## 2019-05-06 ENCOUNTER — TELEPHONE (OUTPATIENT)
Dept: NEUROSURGERY | Facility: CLINIC | Age: 44
End: 2019-05-06

## 2019-05-06 ENCOUNTER — TRANSFERRED RECORDS (OUTPATIENT)
Dept: HEALTH INFORMATION MANAGEMENT | Facility: CLINIC | Age: 44
End: 2019-05-06

## 2019-05-07 ENCOUNTER — PATIENT OUTREACH (OUTPATIENT)
Dept: NEUROSURGERY | Facility: CLINIC | Age: 44
End: 2019-05-07

## 2019-05-07 NOTE — TELEPHONE ENCOUNTER
Writer recieved lab results from [facility], Labled and scanned to pateint chart; sent Epic message  to provider RISHI Bermudez and RN notified

## 2019-05-07 NOTE — PROGRESS NOTES
Outbound call to patient, Na 132, Pt states taking Oxcarbazepine 150mg 4 tablets twice daily.    Pt states that dosage along with Gabapentin 1200mg bid is keeping facial pain under control.    Medication refilled, patient states sore on roof of mouth has improved and almost gone.    Pt will repeat labs in 3 months, LILI

## 2019-05-17 DIAGNOSIS — G50.0 TRIGEMINAL NEURALGIA: ICD-10-CM

## 2019-05-17 RX ORDER — OXCARBAZEPINE 150 MG/1
TABLET, FILM COATED ORAL
Qty: 240 TABLET | Refills: 2 | Status: SHIPPED | OUTPATIENT
Start: 2019-05-17 | End: 2019-09-26

## 2019-05-17 NOTE — TELEPHONE ENCOUNTER
Hong calling for refill of Oxcarbazepine 150mg take 4 tablets twice daily.  Refilled at [facility].    Voices understanding.

## 2019-09-24 ENCOUNTER — PATIENT OUTREACH (OUTPATIENT)
Dept: NEUROSURGERY | Facility: CLINIC | Age: 44
End: 2019-09-24

## 2019-09-24 DIAGNOSIS — G50.0 TRIGEMINAL NEURALGIA: Primary | ICD-10-CM

## 2019-09-24 NOTE — PROGRESS NOTES
Spoke with patient,  New Lab Orders faxed to ,   Medications reviewed, needing refill  Trileptal and Gabapentin same dosage.  Lab. Orders completed for NP Ritter signature prior to faxing.    1430:  CBC and CMP faxed to above number.

## 2019-09-26 ENCOUNTER — PATIENT OUTREACH (OUTPATIENT)
Dept: NEUROSURGERY | Facility: CLINIC | Age: 44
End: 2019-09-26

## 2019-09-26 ENCOUNTER — TRANSFERRED RECORDS (OUTPATIENT)
Dept: HEALTH INFORMATION MANAGEMENT | Facility: CLINIC | Age: 44
End: 2019-09-26

## 2019-09-26 DIAGNOSIS — G50.0 TRIGEMINAL NEURALGIA: ICD-10-CM

## 2019-09-26 RX ORDER — GABAPENTIN 600 MG/1
TABLET ORAL
Qty: 120 TABLET | Refills: 0 | Status: SHIPPED | OUTPATIENT
Start: 2019-09-26 | End: 2019-11-04

## 2019-09-26 RX ORDER — OXCARBAZEPINE 150 MG/1
TABLET, FILM COATED ORAL
Qty: 240 TABLET | Refills: 0 | Status: SHIPPED | OUTPATIENT
Start: 2019-09-26 | End: 2019-11-04

## 2019-09-26 NOTE — PROGRESS NOTES
Call to Hong, asking if Lab work completed as yet.  Lab orders faxed on 9/24/19.  Hong said he was going today for lab work when he gets in town and then heading out of town again on Sunday morning.  Pt asking for 30 day RX today.  Yes can do 30 day.  Lab work is essential to get more medication.  Hong voices understanding, states going today at noon for lab work or tomorrow morning first thing..  Voices understanding.  Medication filled for only 30 days. Pt teachback method completed.

## 2019-10-04 ENCOUNTER — HEALTH MAINTENANCE LETTER (OUTPATIENT)
Age: 44
End: 2019-10-04

## 2019-11-04 ENCOUNTER — CARE COORDINATION (OUTPATIENT)
Dept: NEUROSURGERY | Facility: CLINIC | Age: 44
End: 2019-11-04

## 2019-11-04 ENCOUNTER — PATIENT OUTREACH (OUTPATIENT)
Dept: NEUROSURGERY | Facility: CLINIC | Age: 44
End: 2019-11-04

## 2019-11-04 DIAGNOSIS — G50.0 TRIGEMINAL NEURALGIA: ICD-10-CM

## 2019-11-04 RX ORDER — OXCARBAZEPINE 150 MG/1
TABLET, FILM COATED ORAL
Qty: 720 TABLET | Refills: 0 | Status: SHIPPED | OUTPATIENT
Start: 2019-11-04 | End: 2020-02-17

## 2019-11-04 RX ORDER — GABAPENTIN 600 MG/1
TABLET ORAL
Qty: 360 TABLET | Refills: 0 | Status: SHIPPED | OUTPATIENT
Start: 2019-11-04 | End: 2020-02-17

## 2019-11-04 NOTE — PROGRESS NOTES
Writer called to have labs sent to to Neuro for patient medication refill. Scanned to patient chart; fax of labs given to RN.

## 2019-11-04 NOTE — PROGRESS NOTES
Patient calling for medication refills.  Labs retrieved from outside lab  Completed 9/26/19  Na 131 Therapeutic range 136-145  Last Na level on 5/7/19 was 132.    Medications refilled   Oxycarbazeppoine 150mg tablet take 4 tablets BID Qty 240 with 2 refills  Gabapentin 600mg 2 tablets BID Qty 120    Discussed with patient appointment with Dr Gonzales in 3-4 months to discuss management plan.  Recommend eat extra salt in small amounts as below normal.      Hong voices understanding and thinks that is a good idea, will call to schedule.

## 2020-01-02 ENCOUNTER — PATIENT OUTREACH (OUTPATIENT)
Dept: NEUROSURGERY | Facility: CLINIC | Age: 45
End: 2020-01-02

## 2020-01-06 NOTE — PROGRESS NOTES
Spoke with patient and discussed provider situation.  Dr Brooks and RISHI Bermudez are no longer with the U, patient requesting to see Dr Gonzales and will schedule when available.  Lab work prior to OV with Christian.  Patient voices understanding.

## 2020-01-27 ENCOUNTER — TELEPHONE (OUTPATIENT)
Dept: NEUROSURGERY | Facility: CLINIC | Age: 45
End: 2020-01-27

## 2020-01-27 NOTE — TELEPHONE ENCOUNTER
Patient calling with facial pain that started several days ago, electrical type shocks every 15-20 minutes.  Pt currently on  Trileptal 150mg 4 tablets twice daily  Gabapentin 600mg 2 tablets twice daily.    Pt will try to increase Gabapentin to 3x per day, states does not cause dizziness, fatigue issues.    Appointment made for 1/28/20 @ 1040am with Christian to discuss procedure options.    Voices understanding.

## 2020-02-10 ENCOUNTER — OFFICE VISIT (OUTPATIENT)
Dept: NEUROSURGERY | Facility: CLINIC | Age: 45
End: 2020-02-10

## 2020-02-10 VITALS
WEIGHT: 205.1 LBS | DIASTOLIC BLOOD PRESSURE: 96 MMHG | HEART RATE: 69 BPM | BODY MASS INDEX: 29.43 KG/M2 | RESPIRATION RATE: 16 BRPM | OXYGEN SATURATION: 97 % | SYSTOLIC BLOOD PRESSURE: 138 MMHG

## 2020-02-10 DIAGNOSIS — G50.0 TRIGEMINAL NEURALGIA: ICD-10-CM

## 2020-02-10 DIAGNOSIS — G50.0 TRIGEMINAL NEURALGIA: Primary | ICD-10-CM

## 2020-02-10 LAB
ALBUMIN SERPL-MCNC: 3.9 G/DL (ref 3.4–5)
ALP SERPL-CCNC: 82 U/L (ref 40–150)
ALT SERPL W P-5'-P-CCNC: 26 U/L (ref 0–70)
ANION GAP SERPL CALCULATED.3IONS-SCNC: 3 MMOL/L (ref 3–14)
AST SERPL W P-5'-P-CCNC: 20 U/L (ref 0–45)
BASOPHILS # BLD AUTO: 0.1 10E9/L (ref 0–0.2)
BASOPHILS NFR BLD AUTO: 0.9 %
BILIRUB SERPL-MCNC: 0.4 MG/DL (ref 0.2–1.3)
BUN SERPL-MCNC: 14 MG/DL (ref 7–30)
CALCIUM SERPL-MCNC: 8.9 MG/DL (ref 8.5–10.1)
CHLORIDE SERPL-SCNC: 93 MMOL/L (ref 94–109)
CO2 SERPL-SCNC: 30 MMOL/L (ref 20–32)
CREAT SERPL-MCNC: 0.81 MG/DL (ref 0.66–1.25)
DIFFERENTIAL METHOD BLD: NORMAL
EOSINOPHIL # BLD AUTO: 0.5 10E9/L (ref 0–0.7)
EOSINOPHIL NFR BLD AUTO: 6.6 %
ERYTHROCYTE [DISTWIDTH] IN BLOOD BY AUTOMATED COUNT: 11.9 % (ref 10–15)
GFR SERPL CREATININE-BSD FRML MDRD: >90 ML/MIN/{1.73_M2}
GLUCOSE SERPL-MCNC: 90 MG/DL (ref 70–99)
HCT VFR BLD AUTO: 44.5 % (ref 40–53)
HGB BLD-MCNC: 15.8 G/DL (ref 13.3–17.7)
IMM GRANULOCYTES # BLD: 0.1 10E9/L (ref 0–0.4)
IMM GRANULOCYTES NFR BLD: 0.6 %
LYMPHOCYTES # BLD AUTO: 1.8 10E9/L (ref 0.8–5.3)
LYMPHOCYTES NFR BLD AUTO: 22 %
MCH RBC QN AUTO: 31.2 PG (ref 26.5–33)
MCHC RBC AUTO-ENTMCNC: 35.5 G/DL (ref 31.5–36.5)
MCV RBC AUTO: 88 FL (ref 78–100)
MONOCYTES # BLD AUTO: 0.9 10E9/L (ref 0–1.3)
MONOCYTES NFR BLD AUTO: 10.6 %
NEUTROPHILS # BLD AUTO: 4.8 10E9/L (ref 1.6–8.3)
NEUTROPHILS NFR BLD AUTO: 59.3 %
NRBC # BLD AUTO: 0 10*3/UL
NRBC BLD AUTO-RTO: 0 /100
PLATELET # BLD AUTO: 260 10E9/L (ref 150–450)
POTASSIUM SERPL-SCNC: 4.1 MMOL/L (ref 3.4–5.3)
PROT SERPL-MCNC: 7.5 G/DL (ref 6.8–8.8)
RBC # BLD AUTO: 5.06 10E12/L (ref 4.4–5.9)
SODIUM SERPL-SCNC: 126 MMOL/L (ref 133–144)
WBC # BLD AUTO: 8 10E9/L (ref 4–11)

## 2020-02-10 ASSESSMENT — PAIN SCALES - GENERAL: PAINLEVEL: SEVERE PAIN (7)

## 2020-02-10 NOTE — LETTER
RE: Hong Kramer  [address] [facility] [place] [address]     Dear Colleague,    Thank you for referring your patient, Hong Kramer, to the [facility] NEUROSURGERY at [facility]. Please see a copy of my visit note below.    Service Date: 02/10/2020      Laureano Kelly MD   [facility]   [address] [facility] [place]  [address]      RE: Hong Kramer   MRN: [id number]   : 1975      Dear Dr. Kelly:      I had the pleasure to see Hong today in my Neurosurgical Clinic for evaluation of facial pain.      Briefly, Hong is a 44-year-old nely who has a history of trigeminal neuralgia on the left side of his face.  He has been on Trileptal for the last 4-5 years.  Despite being on Trileptal, he still has breakthrough pain and it has been worse more recently.  When his pain does get worse, he takes more of his Trileptal and he finds himself becoming hyponatremic.  His most recent sodium was 126.  We have cautioned him taking extra Trileptal.      I spent a long time today talking to him about surgical options.  At this point in time, he feels that he needs to proceed with some surgical intervention.  We talked about different ablative procedures in comparison to a microvascular decompression.  We went over the risks and benefits of these procedures in great detail.  At this point in time, he would like to think about the different treatment options but is leaning towards a microvascular decompression.  He will call in the near future with what he wants to do.      Thank you for the opportunity to participate in Hong's care.  If you have any further questions or concerns, please feel free to contact me on my cell phone at [phone].      With kindest personal regards,      Jairo Gonzales MD      Overall, I spent approximately 40 minutes Hong with the majority of this time spent in consultation and developing a treatment plan.      D: 02/10/2020   T:  2020   MT: monica      Name:     MARISSA HUNT   MRN:      [id number]        Account:      [id number]   :      1975           Service Date: 02/10/2020      Document: [id number]

## 2020-02-10 NOTE — PATIENT INSTRUCTIONS
Patient will call back if procedure is desired.    Call Jody RN for questions/concerns     Thank you for using [facility]

## 2020-02-11 NOTE — PROGRESS NOTES
Service Date: 02/10/2020      Laureano Kelly MD   [facility]   [address]  [address]      RE: Hong Kramer   MRN: [id number]   : 1975      Dear Dr. Kelly:      I had the pleasure to see Hong today in my Neurosurgical Clinic for evaluation of facial pain.      Briefly, Hong is a 44-year-old nely who has a history of trigeminal neuralgia on the left side of his face.  He has been on Trileptal for the last 4-5 years.  Despite being on Trileptal, he still has breakthrough pain and it has been worse more recently.  When his pain does get worse, he takes more of his Trileptal and he finds himself becoming hyponatremic.  His most recent sodium was 126.  We have cautioned him taking extra Trileptal.      I spent a long time today talking to him about surgical options.  At this point in time, he feels that he needs to proceed with some surgical intervention.  We talked about different ablative procedures in comparison to a microvascular decompression.  We went over the risks and benefits of these procedures in great detail.  At this point in time, he would like to think about the different treatment options but is leaning towards a microvascular decompression.  He will call in the near future with what he wants to do.      Thank you for the opportunity to participate in Hong's care.  If you have any further questions or concerns, please feel free to contact me on my cell phone at [phone].      With kindest personal regards,      Samaria Bowen MD      Overall, I spent approximately 40 minutes Hong with the majority of this time spent in consultation and developing a treatment plan.         SAMARIA BOWEN MD             D: 02/10/2020   T: 2020   MT: monica      Name:     HONG KRAMER   MRN:      [id number]        Account:      [id number]   :      1975           Service Date: 02/10/2020      Document: [id number]

## 2020-02-17 DIAGNOSIS — G50.0 TRIGEMINAL NEURALGIA: ICD-10-CM

## 2020-02-17 RX ORDER — OXCARBAZEPINE 150 MG/1
TABLET, FILM COATED ORAL
Qty: 720 TABLET | Refills: 0 | Status: SHIPPED | OUTPATIENT
Start: 2020-02-17 | End: 2020-05-18

## 2020-02-17 RX ORDER — GABAPENTIN 600 MG/1
TABLET ORAL
Qty: 360 TABLET | Refills: 0 | Status: SHIPPED | OUTPATIENT
Start: 2020-02-17 | End: 2021-02-15

## 2020-02-26 ENCOUNTER — TELEPHONE (OUTPATIENT)
Dept: NEUROSURGERY | Facility: CLINIC | Age: 45
End: 2020-02-26

## 2020-02-26 ENCOUNTER — HOSPITAL ENCOUNTER (INPATIENT)
Facility: CLINIC | Age: 45
Setting detail: SURGERY ADMIT
End: 2020-02-26
Attending: NEUROLOGICAL SURGERY | Admitting: NEUROLOGICAL SURGERY
Payer: COMMERCIAL

## 2020-02-26 DIAGNOSIS — G50.0 TRIGEMINAL NEURALGIA: Primary | ICD-10-CM

## 2020-02-26 DIAGNOSIS — G50.0 TRIGEMINAL NEURALGIA: ICD-10-CM

## 2020-02-26 NOTE — TELEPHONE ENCOUNTER
Spoke with patient.  Patient states facial pan remains about the same, a lot of breakthrough pains on medications.    Patient asking to schedule Left Microvascular Decompression by Dr Gonzales.  Explaned Dr Gonzales to write order, surgery schedulers will schedule and I will send surgery packet with PCP form and Audiology Order.    Will be in touch with dates/times.    Voices understanding.

## 2020-02-27 ENCOUNTER — TELEPHONE (OUTPATIENT)
Dept: NEUROSURGERY | Facility: CLINIC | Age: 45
End: 2020-02-27

## 2020-02-27 NOTE — TELEPHONE ENCOUNTER
Patient is scheduled for surgery with Dr. Gonzales      Spoke or left message with: Patient    Date of Surgery: 3/27/20    Location [place] OR    H&P: Scheduled with PCP locally Laureano Kelly MD    Post op: NA    Additional imaging/appointments: Patient will also get his audiology appt locally which was okay by Dr. Gonzales's nurse per note.     Surgery packet sent: The nurse will mail out     Additional comments:  The patient is aware they need a pre-op at least a couple of weeks before surgery date. We went over the patient needing a  and someone to stay with them for 24 hours after the surgery. The patient was given my direct number for any questions [phone]

## 2020-03-03 RX ORDER — CEFAZOLIN SODIUM 1 G/3ML
1 INJECTION, POWDER, FOR SOLUTION INTRAMUSCULAR; INTRAVENOUS SEE ADMIN INSTRUCTIONS
Status: CANCELLED | OUTPATIENT
Start: 2020-03-03

## 2020-03-03 RX ORDER — CEFAZOLIN SODIUM 2 G/100ML
2 INJECTION, SOLUTION INTRAVENOUS
Status: CANCELLED | OUTPATIENT
Start: 2020-03-03

## 2020-03-16 ENCOUNTER — TELEPHONE (OUTPATIENT)
Dept: NEUROSURGERY | Facility: CLINIC | Age: 45
End: 2020-03-16

## 2020-03-16 NOTE — TELEPHONE ENCOUNTER
Patient called and canceled his surgery due to the pandemic.  He will call when ready to reschedule.

## 2020-03-16 NOTE — TELEPHONE ENCOUNTER
Patient called on 03/16/2020 at 9:15am to request a reschedule for his surgery on 03/27/2020.    Dr. Gonzales is currently on suspension, so we can't do much at the moment.    I'm forwarding this message to Jody Kendall RN and Glenys who is scheduling Dr. Gonzales surgeries right now. She will be back in the office tomorrow.    I will call the patient back to know that he can expect a call from Glenys Tuesday or Wednesday with an update.

## 2020-05-18 ENCOUNTER — TELEPHONE (OUTPATIENT)
Dept: NEUROSURGERY | Facility: CLINIC | Age: 45
End: 2020-05-18

## 2020-05-18 DIAGNOSIS — G50.0 TRIGEMINAL NEURALGIA: Primary | ICD-10-CM

## 2020-05-18 RX ORDER — OXCARBAZEPINE 150 MG/1
TABLET, FILM COATED ORAL
Qty: 720 TABLET | Refills: 0 | Status: SHIPPED | OUTPATIENT
Start: 2020-05-18 | End: 2021-02-15

## 2020-05-18 NOTE — TELEPHONE ENCOUNTER
Labs drawn today, orders faxed to   Hong Kramer [phone] [facility] [phone]     Patient medication reordered and will callback with lab results, patient leaving town needing medication refill today.  Patient has promised to have lab drawn today before leaving [facility].  States facial pain has been controled with Trileptal for the past several months.

## 2020-11-08 ENCOUNTER — HEALTH MAINTENANCE LETTER (OUTPATIENT)
Age: 45
End: 2020-11-08

## 2021-02-15 ENCOUNTER — TELEPHONE (OUTPATIENT)
Dept: NEUROSURGERY | Facility: CLINIC | Age: 46
End: 2021-02-15

## 2021-02-15 DIAGNOSIS — G50.0 TRIGEMINAL NEURALGIA: ICD-10-CM

## 2021-02-15 RX ORDER — OXCARBAZEPINE 150 MG/1
TABLET, FILM COATED ORAL
Qty: 720 TABLET | Refills: 0 | Status: SHIPPED | OUTPATIENT
Start: 2021-02-15 | End: 2021-06-16

## 2021-02-15 NOTE — TELEPHONE ENCOUNTER
Spoke with Hong.  Hong states facial pain under control with Oxcarbazepine medication and asking for refill.    States not taking Gabapentin anymore.    Lab work completed recently near home, fax number given to get current numbers.  Patient states all within normal limits, will verify when received.    Medication filled.  Voices understanding.

## 2021-06-16 ENCOUNTER — TELEPHONE (OUTPATIENT)
Dept: NEUROSURGERY | Facility: CLINIC | Age: 46
End: 2021-06-16

## 2021-06-16 DIAGNOSIS — G50.0 TRIGEMINAL NEURALGIA: ICD-10-CM

## 2021-06-16 RX ORDER — OXCARBAZEPINE 150 MG/1
TABLET, FILM COATED ORAL
Qty: 720 TABLET | Refills: 1 | Status: SHIPPED | OUTPATIENT
Start: 2021-06-16 | End: 2021-12-01

## 2021-06-16 NOTE — TELEPHONE ENCOUNTER
Patient calling asking for Oxcarbazepine 150mg  medication refill  to [facility].  Patient states lab work completed last month and will send.  States facial pain under good control at this time and not always needing the 600mg AM and PM.   Patient states Insurance change and using [facility].    Refill sent to [facility], will await lab report.    Voices understanding

## 2021-09-11 ENCOUNTER — HEALTH MAINTENANCE LETTER (OUTPATIENT)
Age: 46
End: 2021-09-11

## 2021-11-24 DIAGNOSIS — G50.0 TRIGEMINAL NEURALGIA: ICD-10-CM

## 2021-12-01 DIAGNOSIS — G50.0 TRIGEMINAL NEURALGIA: Primary | ICD-10-CM

## 2021-12-01 RX ORDER — OXCARBAZEPINE 150 MG/1
TABLET, FILM COATED ORAL
Qty: 720 TABLET | Refills: 3 | Status: SHIPPED | OUTPATIENT
Start: 2021-12-01 | End: 2022-12-12

## 2022-01-01 ENCOUNTER — HEALTH MAINTENANCE LETTER (OUTPATIENT)
Age: 47
End: 2022-01-01

## 2022-10-29 ENCOUNTER — HEALTH MAINTENANCE LETTER (OUTPATIENT)
Age: 47
End: 2022-10-29

## 2022-11-21 ENCOUNTER — MYC MEDICAL ADVICE (OUTPATIENT)
Dept: NEUROSURGERY | Facility: CLINIC | Age: 47
End: 2022-11-21

## 2022-11-21 DIAGNOSIS — G50.0 TRIGEMINAL NEURALGIA: ICD-10-CM

## 2022-11-21 NOTE — TELEPHONE ENCOUNTER
Writer routed to Neurosurgery Clinic Scheduling   *RX refill request     Madisyn Richard LPN  Neurosurgery

## 2022-11-21 NOTE — TELEPHONE ENCOUNTER
My Chart note sent to patient, Patient will need CMP and CBC lab work prior to refill.  The lab work can be done near home, I requested a lab fax number to send orders.    Awaiting patient response.

## 2022-12-12 ENCOUNTER — TRANSFERRED RECORDS (OUTPATIENT)
Dept: HEALTH INFORMATION MANAGEMENT | Facility: CLINIC | Age: 47
End: 2022-12-12

## 2022-12-12 ENCOUNTER — TELEPHONE (OUTPATIENT)
Dept: NEUROSURGERY | Facility: CLINIC | Age: 47
End: 2022-12-12

## 2022-12-12 LAB
ALT SERPL-CCNC: 15 U/L (ref 12–78)
AST SERPL-CCNC: 29 U/L (ref 15–37)
CREATININE (EXTERNAL): 0.89 MG/DL (ref 0.6–1.1)
GLUCOSE (EXTERNAL): 91 MG/DL (ref 70–99)
POTASSIUM (EXTERNAL): 4.5 MMOL/L (ref 3.5–5.1)

## 2022-12-12 RX ORDER — OXCARBAZEPINE 150 MG/1
TABLET, FILM COATED ORAL
Qty: 240 TABLET | Refills: 0 | Status: SHIPPED | OUTPATIENT
Start: 2022-12-12 | End: 2023-01-18

## 2022-12-12 NOTE — TELEPHONE ENCOUNTER
Writer faxed lab orders to   Fax # [phone]  Sent [employer] message to patient to let him know orders were faxed.       Madisyn Richard LPN  Neurosurgery

## 2022-12-12 NOTE — TELEPHONE ENCOUNTER
[facility] Call Center    Phone Message    May a detailed message be left on voicemail: yes     Reason for Call: Other: Pt called to inquire if lab orders could be or was sent to:    [address] [facility] [place] [address]  [phone]    Please call Pt back at [phone] to confirm it was sent.  Pt has an appt at 3pm today 12/12/22 and would like to get labs done at that appt.  Pt states he only has 4 days left on medication.      Action Taken: Message routed to:  [facility]surgery    Travel Screening: Not Applicable

## 2022-12-12 NOTE — TELEPHONE ENCOUNTER
[facility] Call Center    Phone Message    May a detailed message be left on voicemail: yes     Reason for Call: Other:   Pt called to inquire if lab orders could be or was sent to:     [address] [address] [phone]  Fax # [phone]     Please call Pt back at [phone] to confirm it was sent.  Pt has an appt at 3pm today 12/12/22 and would like to get labs done at that appt.     Action Taken: Message routed to:  [facility] Neurosurgery    Travel Screening: Not Applicable

## 2022-12-12 NOTE — TELEPHONE ENCOUNTER
Attn: Jody Kendall, RNCC for Dr. Gonzales   *RX refill request     Madisyn Richard LPN  Neurosurgery

## 2023-01-13 DIAGNOSIS — G50.0 TRIGEMINAL NEURALGIA: ICD-10-CM

## 2023-01-16 RX ORDER — OXCARBAZEPINE 150 MG/1
TABLET, FILM COATED ORAL
Qty: 240 TABLET | Refills: 11 | OUTPATIENT
Start: 2023-01-16

## 2023-01-16 NOTE — TELEPHONE ENCOUNTER
Writer routed to Neurosurgery Nurse Pool   Attn; Jody Kendall,RNCC for Dr. Gonzales   *RX Refill request     Madisyn Richard LPN  Neurosurgery   
No

## 2023-01-18 ENCOUNTER — TELEPHONE (OUTPATIENT)
Dept: NEUROSURGERY | Facility: CLINIC | Age: 48
End: 2023-01-18
Payer: COMMERCIAL

## 2023-01-18 DIAGNOSIS — G50.0 TRIGEMINAL NEURALGIA: ICD-10-CM

## 2023-01-18 RX ORDER — OXCARBAZEPINE 150 MG/1
TABLET, FILM COATED ORAL
Qty: 720 TABLET | Refills: 1 | Status: SHIPPED | OUTPATIENT
Start: 2023-01-18 | End: 2023-06-26

## 2023-01-18 NOTE — TELEPHONE ENCOUNTER
Lab work December 12 2022  Spoke with patient, explained I am unable to see lab results.  LOV 2/10/20   Patient faxing Lab results to .  Na 128  Patient takes Trileptal 600mg BID for facial pain, it does keep facial pain under control for the most part.  Patient has upcoming sinus surgery .  Explained need BMP every 3-6 months with low Na levels. Voices understanding, and do for a RTN Telephone OV, [place] drive [place] the [place] state line.    Medication refilled.

## 2023-04-08 ENCOUNTER — HEALTH MAINTENANCE LETTER (OUTPATIENT)
Age: 48
End: 2023-04-08

## 2023-06-26 DIAGNOSIS — G50.0 TRIGEMINAL NEURALGIA: ICD-10-CM

## 2023-06-26 RX ORDER — OXCARBAZEPINE 150 MG/1
TABLET, FILM COATED ORAL
Qty: 720 TABLET | Refills: 3 | Status: ON HOLD | OUTPATIENT
Start: 2023-06-26 | End: 2023-11-30

## 2023-06-27 ENCOUNTER — TELEPHONE (OUTPATIENT)
Dept: NEUROSURGERY | Facility: CLINIC | Age: 48
End: 2023-06-27
Payer: COMMERCIAL

## 2023-06-28 ENCOUNTER — TELEPHONE (OUTPATIENT)
Dept: NEUROSURGERY | Facility: CLINIC | Age: 48
End: 2023-06-28
Payer: COMMERCIAL

## 2023-06-28 NOTE — TELEPHONE ENCOUNTER
RECORDS RECEIVED FROM: Internal   REASON FOR VISIT: Trigeminal Neuralgia   Date of Appt: 7/6/23 12:00pm    NOTES (FOR ALL VISITS) STATUS DETAILS   OFFICE NOTE from other specialist Internal 2/10/20 Jairo Gonzales MD @[facility]-NeuroSurg    11/30/19 [facility]    4/11[facility]18 [facility]    See Additional Encounters   MEDICATION LIST Internal    IMAGING  (FOR ALL VISITS)     MRI (HEAD, NECK, SPINE) Internal [facility]  8/6/15 MR Brain    [facility]'s  9/13/13 MR Brain     CT (HEAD, NECK, SPINE) Internal [facility]  8/6/15 CT Maxilofacial

## 2023-07-06 ENCOUNTER — PRE VISIT (OUTPATIENT)
Dept: NEUROSURGERY | Facility: CLINIC | Age: 48
End: 2023-07-06

## 2023-07-06 ENCOUNTER — VIRTUAL VISIT (OUTPATIENT)
Dept: NEUROSURGERY | Facility: CLINIC | Age: 48
End: 2023-07-06
Payer: COMMERCIAL

## 2023-07-06 DIAGNOSIS — G50.0 TRIGEMINAL NEURALGIA: Primary | ICD-10-CM

## 2023-07-06 PROCEDURE — 99443 PR PHYSICIAN TELEPHONE EVALUATION 21-30 MIN: CPT | Performed by: NURSE PRACTITIONER

## 2023-07-06 NOTE — PROGRESS NOTES
Virtual Visit Details    Type of service:  Telephone Visit   Phone call duration: 30 minutes     [facility]  Department of Neurosurgery      Name: Hong Kramer  MRN: [id number]  Age: 48 year old  : 1975  Referring provider: Jairo Gonzales  2023      Chief Complaint:   Left sided trigeminal neuralgia  Follow-up    History of Present Illness:   Hong Kramer is a 48 year old male with a history of left TN since  who is here today for a follow-up.    Most recently seen in our clinic by Dr. Gonzales on 2/10/2020.  Today, I had a phone visit with the patient.  Per patient, his TN symptoms are much less during summer months.  He tend to have more symptoms during winter time.  He reports intermittent, sharp, electrical shocklike sensation on the left side of his face.  Symptoms can occur anywhere in left V1 and V2 distribution and mildly in left V3.  Touching the left side of his nose and eyebrow, brushing and cold wind can be a trigger.    He has been taking oxcarbazepine for many years.  He currently uses 300 to 600 mg twice daily.  In summer months, he sometimes goes days without taking any medication as his symptoms are very minimal during this time. Patient denies any fatigue or balance issues as side effects.  He has hyponatremia for many years and most recent lab work shows a sodium level of 128 in 2022.  Per patient, he has been completing lab works every 3 months and has been faxing it to [phone].  Unfortunately I am not able to see any lab work since 2022.      Previously treated with gabapentin and carbamazepine.  Never had any surgical procedures for trigeminal neuralgia.      Review of Systems:   Pertinent items are noted in HPI or as in patient entered ROS below, remainder of complete ROS is negative.        No data to display                 Active Medications:     Current Outpatient Medications:      OXcarbazepine (TRILEPTAL) 150 MG tablet,  TAKE 4 TABLETS TWICE A DAY, Disp: 720 tablet, Rfl: 3      Allergies:   Patient has no known allergies.      Past Medical History:  No past medical history on file.  Patient Active Problem List   Diagnosis     Chronic pansinusitis     Trigeminal neuralgia        Past Surgical History:  No past surgical history on file.    Family History:   No family history on file.      Social History:   Social History     Tobacco Use     Smoking status: Never     Smokeless tobacco: Never        Assessment:  Left trigeminal neuralgia  Follow-up    Plan:  Patient prefers to continue oxcarbazepine as this medication has been working very well for him.  He is aware of hyponatremia as a possible side effect.  Recommended lab work every 3 months and to fax the results to our clinic number.  In the future, we can consider pregabalin or baclofen as medication options.      Patient will follow-up with me in person in February-March 2024.    Radha Langford CNP  Department of Neurosurgery    I spent 35 minutes on patient care activities related to this encounter on the date of service, including time spent reviewing the chart, obtaining history and examination and in counseling the patient, and in documentation in the electronic medical record.      Left side, torres are worse, summers gone; can feel it, sometimes get bad episodes; about the same    Left nose, lip,left eyebrow    Triggers: touching the left side of the nose, left eyebrow, brushing the teeth    Sharp, shooting electrical shock like pain. Sometimes burning pain.     Never had any procedures.     Prev on CBZ    Oxcarbazepine 300- 600 twice daily; overall controlling 90% symptoms. During summer, he sometimes he won't take any doses at all.  No side effects. No HA, nausea, balance issues.     Works as a software tech.

## 2023-07-06 NOTE — LETTER
2023       RE: Hong Kramer  [address] [address] [place] [address]     Dear Colleague,    Thank you for referring your patient, Hong Kramer, to the [facility] NEUROSURGERY CLINIC [place] at [facility]. Please see a copy of my visit note below.    [facility]  Department of Neurosurgery      Name: Hong Kramer  MRN: [id number]  Age: 48 year old  : 1975  Referring provider: Jairo Gonzales  2023      Chief Complaint:   Left sided trigeminal neuralgia  Follow-up    History of Present Illness:   Hong Kramer is a 48 year old male with a history of left TN since  who is here today for a follow-up.    Most recently seen in our clinic by Dr. Gonzales on 2/10/2020.  Today, I had a phone visit with the patient.  Per patient, his TN symptoms are much less during summer months.  He tend to have more symptoms during winter time.  He reports intermittent, sharp, electrical shocklike sensation on the left side of his face.  Symptoms can occur anywhere in left V1 and V2 distribution and mildly in left V3.  Touching the left side of his nose and eyebrow, brushing and cold wind can be a trigger.    He has been taking oxcarbazepine for many years.  He currently uses 300 to 600 mg twice daily.  In summer months, he sometimes goes days without taking any medication as his symptoms are very minimal during this time. Patient denies any fatigue or balance issues as side effects.  He has hyponatremia for many years and most recent lab work shows a sodium level of 128 in 2022.  Per patient, he has been completing lab works every 3 months and has been faxing it to [phone].  Unfortunately I am not able to see any lab work since 2022.      Previously treated with gabapentin and carbamazepine.  Never had any surgical procedures for trigeminal neuralgia.      Review of Systems:   Pertinent items are noted in HPI or as  in patient entered ROS below, remainder of complete ROS is negative.        No data to display                 Active Medications:     Current Outpatient Medications:     OXcarbazepine (TRILEPTAL) 150 MG tablet, TAKE 4 TABLETS TWICE A DAY, Disp: 720 tablet, Rfl: 3      Allergies:   Patient has no known allergies.      Past Medical History:  No past medical history on file.  Patient Active Problem List   Diagnosis    Chronic pansinusitis    Trigeminal neuralgia        Past Surgical History:  No past surgical history on file.    Family History:   No family history on file.      Social History:   Social History     Tobacco Use    Smoking status: Never    Smokeless tobacco: Never        Assessment:  Left trigeminal neuralgia  Follow-up    Plan:  Patient prefers to continue oxcarbazepine as this medication has been working very well for him.  He is aware of hyponatremia as a possible side effect.  Recommended lab work every 3 months and to fax the results to our clinic number.  In the future, we can consider pregabalin or baclofen as medication options.      Patient will follow-up with me in person in February-March 2024.    Radha Langford CNP  Department of Neurosurgery    I spent 35 minutes on patient care activities related to this encounter on the date of service, including time spent reviewing the chart, obtaining history and examination and in counseling the patient, and in documentation in the electronic medical record.      Left side, torres are worse, summers gone; can feel it, sometimes get bad episodes; about the same    Left nose, lip,left eyebrow    Triggers: touching the left side of the nose, left eyebrow, brushing the teeth    Sharp, shooting electrical shock like pain. Sometimes burning pain.     Never had any procedures.     Prev on CBZ    Oxcarbazepine 300- 600 twice daily; overall controlling 90% symptoms. During summer, he sometimes he won't take any doses at all.  No side effects. No HA, nausea,  balance issues.     Works as a software tech.               Again, thank you for allowing me to participate in the care of your patient.      Sincerely,    HARPREET Mace CNP

## 2023-07-06 NOTE — PATIENT INSTRUCTIONS
Continue Oxcarbazepine as ordered.     Labs every 3 months.     Follow-up in person in Feb-March 2024.

## 2023-07-06 NOTE — NURSING NOTE
Is the patient currently in the state of [place]? YES    Visit mode:TELEPHONE    If the visit is dropped, the patient can be reconnected by: TELEPHONE VISIT: Phone number: [phone]    Will anyone else be joining the visit? NO      How would you like to obtain your AVS? MyChart    Are changes needed to the allergy or medication list? NO    Reason for visit: Consult

## 2023-10-25 ENCOUNTER — TELEPHONE (OUTPATIENT)
Dept: NEUROSURGERY | Facility: CLINIC | Age: 48
End: 2023-10-25
Payer: COMMERCIAL

## 2023-10-25 DIAGNOSIS — G50.0 TRIGEMINAL NEURALGIA: Primary | ICD-10-CM

## 2023-10-25 NOTE — TELEPHONE ENCOUNTER
Spoke with patient, patient having facial pain on left side, canker sores on left side.  Recommend call PCP and have inside of mouth evaluated.  Patient states gets sores every so often and it triggers facial pain.    Lab orders faxed to .    Will check on Sodium levels and patient will think about changing medication to Pregabalin as discussed in OV with Radha Langofrd NP    Patient to callback after lab work drawn, will fax results to .

## 2023-10-26 ENCOUNTER — TRANSFERRED RECORDS (OUTPATIENT)
Dept: HEALTH INFORMATION MANAGEMENT | Facility: CLINIC | Age: 48
End: 2023-10-26
Payer: COMMERCIAL

## 2023-11-01 ENCOUNTER — TELEPHONE (OUTPATIENT)
Dept: NEUROSURGERY | Facility: CLINIC | Age: 48
End: 2023-11-01
Payer: COMMERCIAL

## 2023-11-02 NOTE — TELEPHONE ENCOUNTER
RECORDS RECEIVED FROM: Internal    REASON FOR VISIT: TN   Date of Appt: 11/14/23 @ 11:20 am    NOTES (FOR ALL VISITS) STATUS DETAILS   OFFICE NOTE from referring provider Internal 7/6/23 Radha Langford, HARPREET CNP @[facility]     OFFICE NOTE from other specialist Internal 2/10/20 Jairo Gonzales MD @[facility]-NeuroSurg    11/30/18 Aggie Bermudez APRN CNP @[facility]-NeurSurg    4/11/18, 10/18/17, 10/26/16, 9/9/15 [facility]    9/9/15, 7/8/15 Gama [facility]jo DDS [facility]    9/9/15 Yaneli [facility]inguez [facility]    See Additional Encounters   MEDICATION LIST Internal    IMAGING  (FOR ALL VISITS)     MRI (HEAD, NECK, SPINE) Internal [facility]  8/6/15 MR Brain    [facility]'s   9/13/13 MR Brain     CT (HEAD, NECK, SPINE) Internal [facility]  8/6/15 CT Maxillofacial

## 2023-11-03 ENCOUNTER — TELEPHONE (OUTPATIENT)
Dept: NEUROSURGERY | Facility: CLINIC | Age: 48
End: 2023-11-03
Payer: COMMERCIAL

## 2023-11-03 DIAGNOSIS — G50.0 TRIGEMINAL NEURALGIA: Primary | ICD-10-CM

## 2023-11-03 NOTE — TELEPHONE ENCOUNTER
Patient calling with severe left sided facial strikes the past several days.  States it has been painful for past 3-4 weeks.  Unable to take more Trileptal, Na level already low at 132.  Trileptal dosage 600mg 2x daily at current time.    Patient was scheduled for Left MVD when covid hit in 2020.  The procedure was canceled.  Patient asking for procedure at this time.  Last OV  with Radha Langford NP July 2023.    Will discuss with Dr Gonzales and get back to patient, he is wanting the procedure.    Voices understanding.

## 2023-11-07 NOTE — TELEPHONE ENCOUNTER
Discussed with Dr. Gonzales who recommended to complete an MRI brain with CISS sequence prior to proceeding with any surgery.  We will contact the patient with this recommendation.

## 2023-11-08 NOTE — TELEPHONE ENCOUNTER
MRI me change:  now at [facility] on 11/14 @ 800 arrival time, and 1120 AM appointment with Dr Gonzales in clinic.  All is set, voices understanding.   Patient driving in the night prior.

## 2023-11-14 ENCOUNTER — OFFICE VISIT (OUTPATIENT)
Dept: NEUROSURGERY | Facility: CLINIC | Age: 48
End: 2023-11-14
Payer: COMMERCIAL

## 2023-11-14 ENCOUNTER — ANCILLARY PROCEDURE (OUTPATIENT)
Dept: MRI IMAGING | Facility: CLINIC | Age: 48
End: 2023-11-14
Attending: NURSE PRACTITIONER
Payer: COMMERCIAL

## 2023-11-14 ENCOUNTER — PRE VISIT (OUTPATIENT)
Dept: NEUROSURGERY | Facility: CLINIC | Age: 48
End: 2023-11-14

## 2023-11-14 VITALS
SYSTOLIC BLOOD PRESSURE: 144 MMHG | DIASTOLIC BLOOD PRESSURE: 95 MMHG | HEART RATE: 80 BPM | BODY MASS INDEX: 28.41 KG/M2 | WEIGHT: 198 LBS | OXYGEN SATURATION: 99 %

## 2023-11-14 DIAGNOSIS — G50.0 TRIGEMINAL NEURALGIA: Primary | ICD-10-CM

## 2023-11-14 DIAGNOSIS — G50.0 TRIGEMINAL NEURALGIA: ICD-10-CM

## 2023-11-14 PROCEDURE — 99212 OFFICE O/P EST SF 10 MIN: CPT | Performed by: NEUROLOGICAL SURGERY

## 2023-11-14 RX ORDER — GADOBUTROL 604.72 MG/ML
0.1 INJECTION INTRAVENOUS ONCE
Status: COMPLETED | OUTPATIENT
Start: 2023-11-14 | End: 2023-11-14

## 2023-11-14 RX ADMIN — GADOBUTROL 9.3 ML: 604.72 INJECTION INTRAVENOUS at 09:21

## 2023-11-14 ASSESSMENT — PAIN SCALES - GENERAL: PAINLEVEL: SEVERE PAIN (7)

## 2023-11-14 NOTE — PATIENT INSTRUCTIONS
You will be called to schedule a Left Microvascular Decompression with Dr Gonzales.    Patient will do pre  anesthesia appointment with PCP in [place].    Call Jody RN  Neurosurgery Care Coordinator with questions/concerns     Thank you for using [facility]

## 2023-11-14 NOTE — LETTER
11/14/2023       RE: Hong Kramer  [address] [address] [place] [address]     Dear Colleague,    Thank you for referring your patient, Hong Kramer, to the [facility] NEUROSURGERY CLINIC [place] at [facility]. Please see a copy of my visit note below.    I had the pleasure to see Hong today in my neurosurgical clinic for evaluation of left-sided trigeminal neuralgia.    Briefly Marilyn is a 48-year-old gentleman from [place].  He has a history of left-sided trigeminal neuralgia.  In the past this has been treated with Trileptal.  He has had a very severe exacerbation at this point in time medication is no longer working and he is seeking a surgical treatment.    In the past I have talked to him about a microvascular decompression versus radiosurgery versus radiofrequency rhizotomy versus balloon rhizotomy.  Ultimately given his young age and MRI demonstrating compression of the trigeminal nerve I do feel that the best option for him is a microvascular decompression.  In the past and today I have discussed with him the pros and cons and risk and benefits of all the different treatment options.  Ultimately after listening to this and understanding the risks and benefits he would like to proceed with a microvascular decompression.    Today I did talk to him about the risks including a 1.5% risk of ischemic or hemorrhagic stroke and a 0.5% risk of death.  We also talked about the risk of CSF leak and infection.    He understands the risk and benefits and would like to proceed with this procedure as soon as possible.  I would like to get him scheduled within the next 4 weeks given his extreme pain.      Again, thank you for allowing me to participate in the care of your patient.      Sincerely,    Jairo Gonzales MD

## 2023-11-14 NOTE — PROGRESS NOTES
I had the pleasure to see Hong today in my neurosurgical clinic for evaluation of left-sided trigeminal neuralgia.    Briefly Marilyn is a 48-year-old gentleman from [place].  He has a history of left-sided trigeminal neuralgia.  In the past this has been treated with Trileptal.  He has had a very severe exacerbation at this point in time medication is no longer working and he is seeking a surgical treatment.    In the past I have talked to him about a microvascular decompression versus radiosurgery versus radiofrequency rhizotomy versus balloon rhizotomy.  Ultimately given his young age and MRI demonstrating compression of the trigeminal nerve I do feel that the best option for him is a microvascular decompression.  In the past and today I have discussed with him the pros and cons and risk and benefits of all the different treatment options.  Ultimately after listening to this and understanding the risks and benefits he would like to proceed with a microvascular decompression.    Today I did talk to him about the risks including a 1.5% risk of ischemic or hemorrhagic stroke and a 0.5% risk of death.  We also talked about the risk of CSF leak and infection.    He understands the risk and benefits and would like to proceed with this procedure as soon as possible.  I would like to get him scheduled within the next 4 weeks given his extreme pain.

## 2023-11-15 ENCOUNTER — TELEPHONE (OUTPATIENT)
Dept: NEUROSURGERY | Facility: CLINIC | Age: 48
End: 2023-11-15
Payer: COMMERCIAL

## 2023-11-15 ENCOUNTER — MYC MEDICAL ADVICE (OUTPATIENT)
Dept: NEUROSURGERY | Facility: CLINIC | Age: 48
End: 2023-11-15
Payer: COMMERCIAL

## 2023-11-15 NOTE — TELEPHONE ENCOUNTER
I called the patient in regards to scheduling surgery with Dr. Gonzales. Patient pre op has been taken care of by PCP. Patient is aware that the nursing team will be reaching out within the next few days. I did tell patient that a nurse will reach out within 2-3 days prior to surgery with arrival time and instructions.    Surgeon: Dr. Gonzales  Date of Surgery: 11/30/23  Location of surgery: [place] [facility]  Pre-Op H&P: 11/15/23  Post-Op Appt Date: TBD   Imaging needed:  No  Discussed COVID-19 testing:  Yes  Pre-cert/Authorization completed:  Yes  Patient aware that pre-op RN will call 2-3 days prior to surgery with arrival time and instructions Yes        Alejandra Bui on 11/15/2023 at 12:51 PM

## 2023-11-16 ENCOUNTER — TRANSFERRED RECORDS (OUTPATIENT)
Dept: HEALTH INFORMATION MANAGEMENT | Facility: CLINIC | Age: 48
End: 2023-11-16

## 2023-11-17 ENCOUNTER — TELEPHONE (OUTPATIENT)
Dept: NEUROSURGERY | Facility: CLINIC | Age: 48
End: 2023-11-17
Payer: COMMERCIAL

## 2023-11-17 NOTE — TELEPHONE ENCOUNTER
Spoke with patient reviewed MVD set for 11/30 , Pre Op by PCP yesterday.  Patient is faxing STD paperwork to .    Plan:  Will let patient now when STD is completed and Pre Op has been received on our end.  Voices understanding.

## 2023-11-21 ENCOUNTER — TELEPHONE (OUTPATIENT)
Dept: NEUROSURGERY | Facility: CLINIC | Age: 48
End: 2023-11-21
Payer: COMMERCIAL

## 2023-11-21 NOTE — TELEPHONE ENCOUNTER
Patient calling asking about STD paperwork.     H&P also in My Chart message from yesterday, will get into chart for H&P , Procedure 11/30/23

## 2023-11-27 ENCOUNTER — DOCUMENTATION ONLY (OUTPATIENT)
Dept: NEUROSURGERY | Facility: CLINIC | Age: 48
End: 2023-11-27
Payer: COMMERCIAL

## 2023-11-27 RX ORDER — AMLODIPINE BESYLATE 10 MG/1
10 TABLET ORAL DAILY
COMMUNITY
Start: 2023-11-13

## 2023-11-27 RX ORDER — LISINOPRIL 5 MG/1
5 TABLET ORAL DAILY
COMMUNITY
Start: 2023-11-13

## 2023-11-27 NOTE — PROGRESS NOTES
PAPERWORK DOCUMENTATION    How Paperwork is received:  My Chart    Type of Paperwork: STD    Who is the paperwork for:  Patient    Received Date November 20, 2023    Who Received the paperwork:  Jody    Form to be Completed by:  Latasha    Anticipated Completion Date: November 27th through 29th, 2023     Date Completed Form Faxed to Requested Entity

## 2023-11-27 NOTE — TELEPHONE ENCOUNTER
[facility] Call Center    Phone Message    May a detailed message be left on voicemail: no     Reason for Call: Other: April is calling to confirm that we received disability forms for this patient, they were sent on 11/21/23. Reference number [id number]. Please call back to clarify that these have been received.      Action Taken: Message routed to:  [facility] [place] [facility]    Travel Screening: Not Applicable

## 2023-11-28 ENCOUNTER — DOCUMENTATION ONLY (OUTPATIENT)
Dept: NEUROSURGERY | Facility: CLINIC | Age: 48
End: 2023-11-28
Payer: COMMERCIAL

## 2023-11-28 NOTE — CONFIDENTIAL NOTE
PAPERWORK DOCUMENTATION    How Paperwork is received:  My Chart    Type of Paperwork: Physician Statement    Who is the paperwork for:  Patient    Received Date November 28, 2023    Who Received the paperwork:  Jody    Form to be Completed by:  Jody    Anticipated Completion Date: 11/28/23    Date Completed Form Faxed to Requested Entity 11/287/23 Faxed to [employer]

## 2023-11-29 ENCOUNTER — ANESTHESIA EVENT (OUTPATIENT)
Dept: SURGERY | Facility: CLINIC | Age: 48
DRG: 026 | End: 2023-11-29
Payer: COMMERCIAL

## 2023-11-29 NOTE — ANESTHESIA PREPROCEDURE EVALUATION
Anesthesia Pre-Procedure Evaluation    Patient: Hong Kramer   MRN: [id number] : 1975        Procedure : Procedure(s):  Left microvascular decompression          History reviewed. No pertinent past medical history.   History reviewed. No pertinent surgical history.   No Known Allergies   Social History     Tobacco Use     Smoking status: Never     Smokeless tobacco: Never   Substance Use Topics     Alcohol use: Yes     Comment: rare      Wt Readings from Last 1 Encounters:   23 89.8 kg (198 lb)        Anesthesia Evaluation   Pt has had prior anesthetic. Type: General.    No history of anesthetic complications       ROS/MED HX  ENT/Pulmonary:       Neurologic: Comment: Trigeminal neuralgia      Cardiovascular:     (+)  hypertension- -   -  - -                                      METS/Exercise Tolerance: >4 METS    Hematologic:       Musculoskeletal:       GI/Hepatic:    (-) GERD   Renal/Genitourinary:       Endo:       Psychiatric/Substance Use:       Infectious Disease:       Malignancy:       Other:            Physical Exam    Airway        Mallampati: I   TM distance: > 3 FB   Neck ROM: full   Mouth opening: > 3 cm    Respiratory Devices and Support         Dental       (+) Minor Abnormalities - some fillings, tiny chips      Cardiovascular             Pulmonary               OUTSIDE LABS:  CBC:   Lab Results   Component Value Date    WBC 8.0 02/10/2020    WBC 6.9 2018    HGB 15.8 02/10/2020    HGB 15.1 2018    HCT 44.5 02/10/2020    HCT 43.1 2018     02/10/2020     2018     BMP:   Lab Results   Component Value Date     (L) 02/10/2020     (L) 2018    POTASSIUM 4.1 02/10/2020    POTASSIUM 3.8 2018    CHLORIDE 93 (L) 02/10/2020    CHLORIDE 97 2018    CO2 30 02/10/2020    CO2 28 2018    BUN 14 02/10/2020    BUN 14 2018    CR 0.81 02/10/2020    CR 0.86 2018    GLC 90 02/10/2020    GLC 74 2018     COAGS: No  "results found for: \"PTT\", \"INR\", \"FIBR\"  POC: No results found for: \"BGM\", \"HCG\", \"HCGS\"  HEPATIC:   Lab Results   Component Value Date    ALBUMIN 3.9 02/10/2020    PROTTOTAL 7.5 02/10/2020    ALT 26 02/10/2020    AST 20 02/10/2020    ALKPHOS 82 02/10/2020    BILITOTAL 0.4 02/10/2020     OTHER:   Lab Results   Component Value Date    FAVIAN 8.9 02/10/2020       Anesthesia Plan    ASA Status:  2    NPO Status:  NPO Appropriate    Anesthesia Type: General.     - Airway: ETT   Induction: Intravenous.   Maintenance: Balanced.   Techniques and Equipment:     - Lines/Monitors: 2nd IV, Arterial Line     - Blood: T&S     Consents    Anesthesia Plan(s) and associated risks, benefits, and realistic alternatives discussed. Questions answered and patient/representative(s) expressed understanding.     - Discussed:     - Discussed with:  Patient      - Extended Intubation/Ventilatory Support Discussed: No.      - Patient is DNR/DNI Status: No     Use of blood products discussed: Yes.     - Discussed with: Patient.     - Consented: consented to blood products     Postoperative Care    Pain management: Multi-modal analgesia.   PONV prophylaxis: Ondansetron (or other 5HT-3), Dexamethasone or Solumedrol     Comments:               Alma Cobb MD    I have reviewed the pertinent notes and labs in the chart from the past 30 days and (re)examined the patient.  Any updates or changes from those notes are reflected in this note.                  "

## 2023-11-30 ENCOUNTER — ANESTHESIA (OUTPATIENT)
Dept: SURGERY | Facility: CLINIC | Age: 48
DRG: 026 | End: 2023-11-30
Payer: COMMERCIAL

## 2023-11-30 ENCOUNTER — HOSPITAL ENCOUNTER (INPATIENT)
Facility: CLINIC | Age: 48
LOS: 2 days | Discharge: HOME | DRG: 026 | End: 2023-12-02
Attending: NEUROLOGICAL SURGERY | Admitting: NEUROLOGICAL SURGERY
Payer: COMMERCIAL

## 2023-11-30 DIAGNOSIS — G50.0 TRIGEMINAL NEURALGIA: ICD-10-CM

## 2023-11-30 DIAGNOSIS — Z98.890 STATUS POST CRANIOTOMY: Primary | ICD-10-CM

## 2023-11-30 LAB
ABO/RH(D): NORMAL
ANION GAP SERPL CALCULATED.3IONS-SCNC: 10 MMOL/L (ref 7–15)
ANION GAP SERPL CALCULATED.3IONS-SCNC: 9 MMOL/L (ref 7–15)
ANTIBODY SCREEN: NEGATIVE
BUN SERPL-MCNC: 11.2 MG/DL (ref 6–20)
BUN SERPL-MCNC: 9.7 MG/DL (ref 6–20)
CA-I BLD-MCNC: 4.4 MG/DL (ref 4.4–5.2)
CA-I BLD-MCNC: 4.5 MG/DL (ref 4.4–5.2)
CALCIUM SERPL-MCNC: 8.5 MG/DL (ref 8.6–10)
CALCIUM SERPL-MCNC: 9 MG/DL (ref 8.6–10)
CHLORIDE SERPL-SCNC: 91 MMOL/L (ref 98–107)
CHLORIDE SERPL-SCNC: 92 MMOL/L (ref 98–107)
CREAT SERPL-MCNC: 0.73 MG/DL (ref 0.67–1.17)
CREAT SERPL-MCNC: 0.79 MG/DL (ref 0.67–1.17)
DEPRECATED HCO3 PLAS-SCNC: 22 MMOL/L (ref 22–29)
DEPRECATED HCO3 PLAS-SCNC: 24 MMOL/L (ref 22–29)
EGFRCR SERPLBLD CKD-EPI 2021: >90 ML/MIN/1.73M2
EGFRCR SERPLBLD CKD-EPI 2021: >90 ML/MIN/1.73M2
ERYTHROCYTE [DISTWIDTH] IN BLOOD BY AUTOMATED COUNT: 11.9 % (ref 10–15)
ERYTHROCYTE [DISTWIDTH] IN BLOOD BY AUTOMATED COUNT: 11.9 % (ref 10–15)
GLUCOSE BLDC GLUCOMTR-MCNC: 114 MG/DL (ref 70–99)
GLUCOSE BLDC GLUCOMTR-MCNC: 176 MG/DL (ref 70–99)
GLUCOSE SERPL-MCNC: 109 MG/DL (ref 70–99)
GLUCOSE SERPL-MCNC: 155 MG/DL (ref 70–99)
HCT VFR BLD AUTO: 37.3 % (ref 40–53)
HCT VFR BLD AUTO: 40.3 % (ref 40–53)
HGB BLD-MCNC: 13.7 G/DL (ref 13.3–17.7)
HGB BLD-MCNC: 14.6 G/DL (ref 13.3–17.7)
MAGNESIUM SERPL-MCNC: 1.9 MG/DL (ref 1.7–2.3)
MCH RBC QN AUTO: 31 PG (ref 26.5–33)
MCH RBC QN AUTO: 31.1 PG (ref 26.5–33)
MCHC RBC AUTO-ENTMCNC: 36.2 G/DL (ref 31.5–36.5)
MCHC RBC AUTO-ENTMCNC: 36.7 G/DL (ref 31.5–36.5)
MCV RBC AUTO: 85 FL (ref 78–100)
MCV RBC AUTO: 86 FL (ref 78–100)
PHOSPHATE SERPL-MCNC: 3.4 MG/DL (ref 2.5–4.5)
PLATELET # BLD AUTO: 286 10E3/UL (ref 150–450)
PLATELET # BLD AUTO: 331 10E3/UL (ref 150–450)
POTASSIUM SERPL-SCNC: 4.4 MMOL/L (ref 3.4–5.3)
POTASSIUM SERPL-SCNC: 4.7 MMOL/L (ref 3.4–5.3)
RBC # BLD AUTO: 4.41 10E6/UL (ref 4.4–5.9)
RBC # BLD AUTO: 4.71 10E6/UL (ref 4.4–5.9)
SODIUM SERPL-SCNC: 122 MMOL/L (ref 135–145)
SODIUM SERPL-SCNC: 123 MMOL/L (ref 135–145)
SODIUM SERPL-SCNC: 125 MMOL/L (ref 135–145)
SPECIMEN EXPIRATION DATE: NORMAL
WBC # BLD AUTO: 12.8 10E3/UL (ref 4–11)
WBC # BLD AUTO: 6.8 10E3/UL (ref 4–11)

## 2023-11-30 PROCEDURE — 370N000017 HC ANESTHESIA TECHNICAL FEE, PER MIN: Performed by: NEUROLOGICAL SURGERY

## 2023-11-30 PROCEDURE — 272N000004 HC RX 272: Performed by: NEUROLOGICAL SURGERY

## 2023-11-30 PROCEDURE — 61458 CRNEC SOPL XPL/DCMPR CRL NRV: CPT | Mod: GC | Performed by: NEUROLOGICAL SURGERY

## 2023-11-30 PROCEDURE — 250N000009 HC RX 250

## 2023-11-30 PROCEDURE — 86901 BLOOD TYPING SEROLOGIC RH(D): CPT | Performed by: ANESTHESIOLOGY

## 2023-11-30 PROCEDURE — 36415 COLL VENOUS BLD VENIPUNCTURE: CPT | Performed by: ANESTHESIOLOGY

## 2023-11-30 PROCEDURE — 258N000003 HC RX IP 258 OP 636

## 2023-11-30 PROCEDURE — 84100 ASSAY OF PHOSPHORUS: CPT

## 2023-11-30 PROCEDURE — 250N000013 HC RX MED GY IP 250 OP 250 PS 637

## 2023-11-30 PROCEDURE — 710N000010 HC RECOVERY PHASE 1, LEVEL 2, PER MIN: Performed by: NEUROLOGICAL SURGERY

## 2023-11-30 PROCEDURE — 82330 ASSAY OF CALCIUM: CPT

## 2023-11-30 PROCEDURE — 250N000011 HC RX IP 250 OP 636: Mod: JZ

## 2023-11-30 PROCEDURE — 4A1034Z MONITORING OF CENTRAL NERVOUS ELECTRICAL ACTIVITY, PERCUTANEOUS APPROACH: ICD-10-PCS | Performed by: NEUROLOGICAL SURGERY

## 2023-11-30 PROCEDURE — 250N000025 HC SEVOFLURANE, PER MIN: Performed by: NEUROLOGICAL SURGERY

## 2023-11-30 PROCEDURE — 85027 COMPLETE CBC AUTOMATED: CPT | Performed by: ANESTHESIOLOGY

## 2023-11-30 PROCEDURE — 250N000011 HC RX IP 250 OP 636

## 2023-11-30 PROCEDURE — 250N000009 HC RX 250: Performed by: NEUROLOGICAL SURGERY

## 2023-11-30 PROCEDURE — 69990 MICROSURGERY ADD-ON: CPT | Mod: GC | Performed by: NEUROLOGICAL SURGERY

## 2023-11-30 PROCEDURE — 82310 ASSAY OF CALCIUM: CPT | Performed by: ANESTHESIOLOGY

## 2023-11-30 PROCEDURE — 999N000141 HC STATISTIC PRE-PROCEDURE NURSING ASSESSMENT: Performed by: NEUROLOGICAL SURGERY

## 2023-11-30 PROCEDURE — C1713 ANCHOR/SCREW BN/BN,TIS/BN: HCPCS | Performed by: NEUROLOGICAL SURGERY

## 2023-11-30 PROCEDURE — 360N000079 HC SURGERY LEVEL 6, PER MIN: Performed by: NEUROLOGICAL SURGERY

## 2023-11-30 PROCEDURE — 272N000001 HC OR GENERAL SUPPLY STERILE: Performed by: NEUROLOGICAL SURGERY

## 2023-11-30 PROCEDURE — 200N000002 HC R&B ICU UMMC

## 2023-11-30 PROCEDURE — 00U20JZ SUPPLEMENT DURA MATER WITH SYNTHETIC SUBSTITUTE, OPEN APPROACH: ICD-10-PCS | Performed by: NEUROLOGICAL SURGERY

## 2023-11-30 PROCEDURE — 85027 COMPLETE CBC AUTOMATED: CPT

## 2023-11-30 PROCEDURE — 83735 ASSAY OF MAGNESIUM: CPT

## 2023-11-30 PROCEDURE — 84295 ASSAY OF SERUM SODIUM: CPT

## 2023-11-30 PROCEDURE — 250N000013 HC RX MED GY IP 250 OP 250 PS 637: Performed by: ANESTHESIOLOGY

## 2023-11-30 PROCEDURE — 250N000011 HC RX IP 250 OP 636: Performed by: STUDENT IN AN ORGANIZED HEALTH CARE EDUCATION/TRAINING PROGRAM

## 2023-11-30 PROCEDURE — C1781 MESH (IMPLANTABLE): HCPCS | Performed by: NEUROLOGICAL SURGERY

## 2023-11-30 PROCEDURE — 80048 BASIC METABOLIC PNL TOTAL CA: CPT

## 2023-11-30 PROCEDURE — 00NK0ZZ RELEASE TRIGEMINAL NERVE, OPEN APPROACH: ICD-10-PCS | Performed by: NEUROLOGICAL SURGERY

## 2023-11-30 DEVICE — IMP SCR SYN MATRIX LOW PRO 1.5X04MM SELF DRILL 04.503.104.01: Type: IMPLANTABLE DEVICE | Site: CRANIAL | Status: FUNCTIONAL

## 2023-11-30 DEVICE — IMP MESH SYN MATRIX NEURO CONTOUR 38X45MM 04.503.081: Type: IMPLANTABLE DEVICE | Site: CRANIAL | Status: FUNCTIONAL

## 2023-11-30 RX ORDER — CEFAZOLIN SODIUM 2 G/100ML
2 INJECTION, SOLUTION INTRAVENOUS EVERY 8 HOURS
Status: DISCONTINUED | OUTPATIENT
Start: 2023-11-30 | End: 2023-12-02 | Stop reason: HOSPADM

## 2023-11-30 RX ORDER — SODIUM CHLORIDE 9 MG/ML
INJECTION, SOLUTION INTRAVENOUS CONTINUOUS
Status: DISCONTINUED | OUTPATIENT
Start: 2023-11-30 | End: 2023-11-30

## 2023-11-30 RX ORDER — GLYCOPYRROLATE 0.2 MG/ML
INJECTION, SOLUTION INTRAMUSCULAR; INTRAVENOUS PRN
Status: DISCONTINUED | OUTPATIENT
Start: 2023-11-30 | End: 2023-11-30

## 2023-11-30 RX ORDER — METHOCARBAMOL 750 MG/1
750 TABLET, FILM COATED ORAL 4 TIMES DAILY
Status: DISCONTINUED | OUTPATIENT
Start: 2023-11-30 | End: 2023-12-02 | Stop reason: HOSPADM

## 2023-11-30 RX ORDER — ONDANSETRON 4 MG/1
4 TABLET, ORALLY DISINTEGRATING ORAL EVERY 6 HOURS PRN
Qty: 16 TABLET | Refills: 0 | Status: SHIPPED | OUTPATIENT
Start: 2023-11-30

## 2023-11-30 RX ORDER — ACETAMINOPHEN 325 MG/1
650 TABLET ORAL EVERY 4 HOURS PRN
Qty: 60 TABLET | Refills: 1 | Status: SHIPPED | OUTPATIENT
Start: 2023-12-03

## 2023-11-30 RX ORDER — NALOXONE HYDROCHLORIDE 0.4 MG/ML
0.4 INJECTION, SOLUTION INTRAMUSCULAR; INTRAVENOUS; SUBCUTANEOUS
Status: DISCONTINUED | OUTPATIENT
Start: 2023-11-30 | End: 2023-12-02 | Stop reason: HOSPADM

## 2023-11-30 RX ORDER — OXCARBAZEPINE 300 MG/1
300 TABLET, FILM COATED ORAL 2 TIMES DAILY
Qty: 60 TABLET | Refills: 3 | Status: SHIPPED | OUTPATIENT
Start: 2023-12-01

## 2023-11-30 RX ORDER — OXYCODONE HYDROCHLORIDE 5 MG/1
5 TABLET ORAL EVERY 4 HOURS PRN
Status: DISCONTINUED | OUTPATIENT
Start: 2023-11-30 | End: 2023-12-02 | Stop reason: HOSPADM

## 2023-11-30 RX ORDER — POLYETHYLENE GLYCOL 3350 17 G/17G
17 POWDER, FOR SOLUTION ORAL DAILY
Status: DISCONTINUED | OUTPATIENT
Start: 2023-12-01 | End: 2023-12-02 | Stop reason: HOSPADM

## 2023-11-30 RX ORDER — AMOXICILLIN 250 MG
1 CAPSULE ORAL 2 TIMES DAILY
Status: DISCONTINUED | OUTPATIENT
Start: 2023-11-30 | End: 2023-12-02 | Stop reason: HOSPADM

## 2023-11-30 RX ORDER — CEFAZOLIN SODIUM/WATER 2 G/20 ML
2 SYRINGE (ML) INTRAVENOUS SEE ADMIN INSTRUCTIONS
Status: DISCONTINUED | OUTPATIENT
Start: 2023-11-30 | End: 2023-11-30 | Stop reason: HOSPADM

## 2023-11-30 RX ORDER — ONDANSETRON 4 MG/1
4 TABLET, ORALLY DISINTEGRATING ORAL EVERY 6 HOURS PRN
Status: DISCONTINUED | OUTPATIENT
Start: 2023-11-30 | End: 2023-12-02 | Stop reason: HOSPADM

## 2023-11-30 RX ORDER — CALCIUM CARBONATE 500 MG/1
500 TABLET, CHEWABLE ORAL 2 TIMES DAILY PRN
Status: DISCONTINUED | OUTPATIENT
Start: 2023-11-30 | End: 2023-12-02 | Stop reason: HOSPADM

## 2023-11-30 RX ORDER — LISINOPRIL 2.5 MG/1
5 TABLET ORAL DAILY
Status: DISCONTINUED | OUTPATIENT
Start: 2023-11-30 | End: 2023-12-02 | Stop reason: HOSPADM

## 2023-11-30 RX ORDER — OXCARBAZEPINE 600 MG/1
600 TABLET, FILM COATED ORAL 2 TIMES DAILY
Status: DISCONTINUED | OUTPATIENT
Start: 2023-11-30 | End: 2023-11-30

## 2023-11-30 RX ORDER — PHENYLEPHRINE HCL IN 0.9% NACL 50MG/250ML
.5-1.25 PLASTIC BAG, INJECTION (ML) INTRAVENOUS CONTINUOUS
Status: DISCONTINUED | OUTPATIENT
Start: 2023-11-30 | End: 2023-11-30

## 2023-11-30 RX ORDER — BISACODYL 10 MG
10 SUPPOSITORY, RECTAL RECTAL DAILY PRN
Status: DISCONTINUED | OUTPATIENT
Start: 2023-11-30 | End: 2023-12-02 | Stop reason: HOSPADM

## 2023-11-30 RX ORDER — BUPIVACAINE HYDROCHLORIDE AND EPINEPHRINE 2.5; 5 MG/ML; UG/ML
INJECTION, SOLUTION EPIDURAL; INFILTRATION; INTRACAUDAL; PERINEURAL PRN
Status: DISCONTINUED | OUTPATIENT
Start: 2023-11-30 | End: 2023-11-30 | Stop reason: HOSPADM

## 2023-11-30 RX ORDER — ACETAMINOPHEN 325 MG/1
650 TABLET ORAL EVERY 4 HOURS PRN
Status: DISCONTINUED | OUTPATIENT
Start: 2023-12-03 | End: 2023-12-02 | Stop reason: HOSPADM

## 2023-11-30 RX ORDER — FENTANYL CITRATE 50 UG/ML
25 INJECTION, SOLUTION INTRAMUSCULAR; INTRAVENOUS EVERY 5 MIN PRN
Status: DISCONTINUED | OUTPATIENT
Start: 2023-11-30 | End: 2023-11-30 | Stop reason: HOSPADM

## 2023-11-30 RX ORDER — AMOXICILLIN 250 MG
1 CAPSULE ORAL 2 TIMES DAILY
Qty: 30 TABLET | Refills: 0 | Status: SHIPPED | OUTPATIENT
Start: 2023-12-01 | End: 2023-12-16

## 2023-11-30 RX ORDER — ONDANSETRON 4 MG/1
4 TABLET, ORALLY DISINTEGRATING ORAL EVERY 30 MIN PRN
Status: DISCONTINUED | OUTPATIENT
Start: 2023-11-30 | End: 2023-11-30 | Stop reason: HOSPADM

## 2023-11-30 RX ORDER — SODIUM CHLORIDE, SODIUM LACTATE, POTASSIUM CHLORIDE, CALCIUM CHLORIDE 600; 310; 30; 20 MG/100ML; MG/100ML; MG/100ML; MG/100ML
INJECTION, SOLUTION INTRAVENOUS CONTINUOUS PRN
Status: DISCONTINUED | OUTPATIENT
Start: 2023-11-30 | End: 2023-11-30

## 2023-11-30 RX ORDER — OXCARBAZEPINE 300 MG/1
300 TABLET, FILM COATED ORAL 2 TIMES DAILY
Status: DISCONTINUED | OUTPATIENT
Start: 2023-11-30 | End: 2023-12-02 | Stop reason: HOSPADM

## 2023-11-30 RX ORDER — ACETAMINOPHEN 325 MG/1
975 TABLET ORAL EVERY 8 HOURS
Status: DISCONTINUED | OUTPATIENT
Start: 2023-11-30 | End: 2023-12-02 | Stop reason: HOSPADM

## 2023-11-30 RX ORDER — FENTANYL CITRATE 50 UG/ML
INJECTION, SOLUTION INTRAMUSCULAR; INTRAVENOUS PRN
Status: DISCONTINUED | OUTPATIENT
Start: 2023-11-30 | End: 2023-11-30

## 2023-11-30 RX ORDER — DEXAMETHASONE SODIUM PHOSPHATE 4 MG/ML
INJECTION, SOLUTION INTRA-ARTICULAR; INTRALESIONAL; INTRAMUSCULAR; INTRAVENOUS; SOFT TISSUE PRN
Status: DISCONTINUED | OUTPATIENT
Start: 2023-11-30 | End: 2023-11-30

## 2023-11-30 RX ORDER — LABETALOL HYDROCHLORIDE 5 MG/ML
10-40 INJECTION, SOLUTION INTRAVENOUS EVERY 10 MIN PRN
Status: DISCONTINUED | OUTPATIENT
Start: 2023-11-30 | End: 2023-12-02 | Stop reason: HOSPADM

## 2023-11-30 RX ORDER — METHOCARBAMOL 750 MG/1
750 TABLET, FILM COATED ORAL 3 TIMES DAILY PRN
Qty: 40 TABLET | Refills: 0 | Status: SHIPPED | OUTPATIENT
Start: 2023-11-30

## 2023-11-30 RX ORDER — NALOXONE HYDROCHLORIDE 0.4 MG/ML
0.2 INJECTION, SOLUTION INTRAMUSCULAR; INTRAVENOUS; SUBCUTANEOUS
Status: DISCONTINUED | OUTPATIENT
Start: 2023-11-30 | End: 2023-12-02 | Stop reason: HOSPADM

## 2023-11-30 RX ORDER — LIDOCAINE 40 MG/G
CREAM TOPICAL
Status: DISCONTINUED | OUTPATIENT
Start: 2023-11-30 | End: 2023-12-02 | Stop reason: HOSPADM

## 2023-11-30 RX ORDER — CEFAZOLIN SODIUM/WATER 2 G/20 ML
2 SYRINGE (ML) INTRAVENOUS
Status: COMPLETED | OUTPATIENT
Start: 2023-11-30 | End: 2023-11-30

## 2023-11-30 RX ORDER — SODIUM CHLORIDE, SODIUM LACTATE, POTASSIUM CHLORIDE, CALCIUM CHLORIDE 600; 310; 30; 20 MG/100ML; MG/100ML; MG/100ML; MG/100ML
INJECTION, SOLUTION INTRAVENOUS CONTINUOUS
Status: DISCONTINUED | OUTPATIENT
Start: 2023-11-30 | End: 2023-11-30 | Stop reason: HOSPADM

## 2023-11-30 RX ORDER — HYDRALAZINE HYDROCHLORIDE 20 MG/ML
10-20 INJECTION INTRAMUSCULAR; INTRAVENOUS EVERY 30 MIN PRN
Status: DISCONTINUED | OUTPATIENT
Start: 2023-11-30 | End: 2023-12-02 | Stop reason: HOSPADM

## 2023-11-30 RX ORDER — FENTANYL CITRATE 50 UG/ML
50 INJECTION, SOLUTION INTRAMUSCULAR; INTRAVENOUS EVERY 5 MIN PRN
Status: DISCONTINUED | OUTPATIENT
Start: 2023-11-30 | End: 2023-11-30 | Stop reason: HOSPADM

## 2023-11-30 RX ORDER — HYDROMORPHONE HCL IN WATER/PF 6 MG/30 ML
0.2 PATIENT CONTROLLED ANALGESIA SYRINGE INTRAVENOUS EVERY 5 MIN PRN
Status: DISCONTINUED | OUTPATIENT
Start: 2023-11-30 | End: 2023-11-30 | Stop reason: HOSPADM

## 2023-11-30 RX ORDER — ONDANSETRON 2 MG/ML
4 INJECTION INTRAMUSCULAR; INTRAVENOUS EVERY 6 HOURS PRN
Status: DISCONTINUED | OUTPATIENT
Start: 2023-11-30 | End: 2023-12-02 | Stop reason: HOSPADM

## 2023-11-30 RX ORDER — ONDANSETRON 2 MG/ML
4 INJECTION INTRAMUSCULAR; INTRAVENOUS EVERY 30 MIN PRN
Status: DISCONTINUED | OUTPATIENT
Start: 2023-11-30 | End: 2023-11-30 | Stop reason: HOSPADM

## 2023-11-30 RX ORDER — AMLODIPINE BESYLATE 10 MG/1
10 TABLET ORAL DAILY
Status: DISCONTINUED | OUTPATIENT
Start: 2023-11-30 | End: 2023-11-30

## 2023-11-30 RX ORDER — HYDROMORPHONE HCL IN WATER/PF 6 MG/30 ML
.2-.4 PATIENT CONTROLLED ANALGESIA SYRINGE INTRAVENOUS
Status: DISCONTINUED | OUTPATIENT
Start: 2023-11-30 | End: 2023-12-01

## 2023-11-30 RX ORDER — HYDROMORPHONE HCL IN WATER/PF 6 MG/30 ML
0.4 PATIENT CONTROLLED ANALGESIA SYRINGE INTRAVENOUS EVERY 5 MIN PRN
Status: DISCONTINUED | OUTPATIENT
Start: 2023-11-30 | End: 2023-11-30 | Stop reason: HOSPADM

## 2023-11-30 RX ORDER — OXYCODONE HYDROCHLORIDE 5 MG/1
5 TABLET ORAL EVERY 6 HOURS PRN
Qty: 16 TABLET | Refills: 0 | Status: SHIPPED | OUTPATIENT
Start: 2023-11-30

## 2023-11-30 RX ORDER — LABETALOL HYDROCHLORIDE 5 MG/ML
INJECTION, SOLUTION INTRAVENOUS
Status: COMPLETED
Start: 2023-11-30 | End: 2023-11-30

## 2023-11-30 RX ORDER — PROPOFOL 10 MG/ML
INJECTION, EMULSION INTRAVENOUS PRN
Status: DISCONTINUED | OUTPATIENT
Start: 2023-11-30 | End: 2023-11-30

## 2023-11-30 RX ORDER — ONDANSETRON 2 MG/ML
INJECTION INTRAMUSCULAR; INTRAVENOUS PRN
Status: DISCONTINUED | OUTPATIENT
Start: 2023-11-30 | End: 2023-11-30

## 2023-11-30 RX ORDER — LIDOCAINE HYDROCHLORIDE 20 MG/ML
INJECTION, SOLUTION INFILTRATION; PERINEURAL PRN
Status: DISCONTINUED | OUTPATIENT
Start: 2023-11-30 | End: 2023-11-30

## 2023-11-30 RX ORDER — PROCHLORPERAZINE MALEATE 5 MG
10 TABLET ORAL EVERY 6 HOURS PRN
Status: DISCONTINUED | OUTPATIENT
Start: 2023-11-30 | End: 2023-12-02 | Stop reason: HOSPADM

## 2023-11-30 RX ORDER — AMLODIPINE BESYLATE 10 MG/1
10 TABLET ORAL DAILY
Status: DISCONTINUED | OUTPATIENT
Start: 2023-11-30 | End: 2023-12-02 | Stop reason: HOSPADM

## 2023-11-30 RX ORDER — ACETAMINOPHEN 325 MG/1
975 TABLET ORAL ONCE
Status: COMPLETED | OUTPATIENT
Start: 2023-11-30 | End: 2023-11-30

## 2023-11-30 RX ORDER — HYDROMORPHONE HCL IN WATER/PF 6 MG/30 ML
0.2 PATIENT CONTROLLED ANALGESIA SYRINGE INTRAVENOUS
Status: DISCONTINUED | OUTPATIENT
Start: 2023-11-30 | End: 2023-11-30

## 2023-11-30 RX ORDER — OXYCODONE HYDROCHLORIDE 10 MG/1
10 TABLET ORAL EVERY 4 HOURS PRN
Status: DISCONTINUED | OUTPATIENT
Start: 2023-11-30 | End: 2023-12-02 | Stop reason: HOSPADM

## 2023-11-30 RX ADMIN — HYDRALAZINE HYDROCHLORIDE 20 MG: 20 INJECTION INTRAMUSCULAR; INTRAVENOUS at 14:51

## 2023-11-30 RX ADMIN — LABETALOL HYDROCHLORIDE 10 MG: 5 INJECTION, SOLUTION INTRAVENOUS at 14:31

## 2023-11-30 RX ADMIN — LABETALOL HYDROCHLORIDE 10 MG: 5 INJECTION, SOLUTION INTRAVENOUS at 14:39

## 2023-11-30 RX ADMIN — Medication 20 MG: at 09:34

## 2023-11-30 RX ADMIN — SODIUM CHLORIDE, POTASSIUM CHLORIDE, SODIUM LACTATE AND CALCIUM CHLORIDE: 600; 310; 30; 20 INJECTION, SOLUTION INTRAVENOUS at 07:42

## 2023-11-30 RX ADMIN — PROPOFOL 30 MG: 10 INJECTION, EMULSION INTRAVENOUS at 09:09

## 2023-11-30 RX ADMIN — AMLODIPINE BESYLATE 10 MG: 10 TABLET ORAL at 18:28

## 2023-11-30 RX ADMIN — Medication 10 MG: at 11:34

## 2023-11-30 RX ADMIN — NICARDIPINE HYDROCHLORIDE 7.5 MG/HR: 0.2 INJECTION, SOLUTION INTRAVENOUS at 20:50

## 2023-11-30 RX ADMIN — OXCARBAZEPINE 300 MG: 300 TABLET, FILM COATED ORAL at 20:07

## 2023-11-30 RX ADMIN — ACETAMINOPHEN 975 MG: 325 TABLET, FILM COATED ORAL at 06:05

## 2023-11-30 RX ADMIN — SUGAMMADEX 200 MG: 100 INJECTION, SOLUTION INTRAVENOUS at 12:37

## 2023-11-30 RX ADMIN — ACETAMINOPHEN 975 MG: 325 TABLET, FILM COATED ORAL at 22:09

## 2023-11-30 RX ADMIN — OXYCODONE HYDROCHLORIDE 5 MG: 5 TABLET ORAL at 20:06

## 2023-11-30 RX ADMIN — PHENYLEPHRINE HYDROCHLORIDE 100 MCG: 10 INJECTION INTRAVENOUS at 08:38

## 2023-11-30 RX ADMIN — PROPOFOL 30 MG: 10 INJECTION, EMULSION INTRAVENOUS at 08:48

## 2023-11-30 RX ADMIN — SODIUM CHLORIDE: 9 INJECTION, SOLUTION INTRAVENOUS at 13:39

## 2023-11-30 RX ADMIN — FENTANYL CITRATE 50 MCG: 50 INJECTION INTRAMUSCULAR; INTRAVENOUS at 09:07

## 2023-11-30 RX ADMIN — GLYCOPYRROLATE 0.2 MG: 0.2 INJECTION, SOLUTION INTRAMUSCULAR; INTRAVENOUS at 08:30

## 2023-11-30 RX ADMIN — Medication 20 MG: at 09:07

## 2023-11-30 RX ADMIN — NICARDIPINE HYDROCHLORIDE 2.5 MG/HR: 0.2 INJECTION, SOLUTION INTRAVENOUS at 15:38

## 2023-11-30 RX ADMIN — MIDAZOLAM 2 MG: 1 INJECTION INTRAMUSCULAR; INTRAVENOUS at 07:33

## 2023-11-30 RX ADMIN — SODIUM CHLORIDE, POTASSIUM CHLORIDE, SODIUM LACTATE AND CALCIUM CHLORIDE: 600; 310; 30; 20 INJECTION, SOLUTION INTRAVENOUS at 11:00

## 2023-11-30 RX ADMIN — LIDOCAINE HYDROCHLORIDE 100 MG: 20 INJECTION, SOLUTION INFILTRATION; PERINEURAL at 07:49

## 2023-11-30 RX ADMIN — FENTANYL CITRATE 50 MCG: 50 INJECTION INTRAMUSCULAR; INTRAVENOUS at 11:10

## 2023-11-30 RX ADMIN — CEFAZOLIN SODIUM 2 G: 2 INJECTION, SOLUTION INTRAVENOUS at 15:27

## 2023-11-30 RX ADMIN — PHENYLEPHRINE HYDROCHLORIDE 100 MCG: 10 INJECTION INTRAVENOUS at 08:57

## 2023-11-30 RX ADMIN — DEXAMETHASONE SODIUM PHOSPHATE 10 MG: 4 INJECTION, SOLUTION INTRA-ARTICULAR; INTRALESIONAL; INTRAMUSCULAR; INTRAVENOUS; SOFT TISSUE at 07:52

## 2023-11-30 RX ADMIN — PHENYLEPHRINE HYDROCHLORIDE 100 MCG: 10 INJECTION INTRAVENOUS at 09:16

## 2023-11-30 RX ADMIN — SODIUM CHLORIDE, SODIUM LACTATE, POTASSIUM CHLORIDE, CALCIUM CHLORIDE: 600; 310; 30; 20 INJECTION, SOLUTION INTRAVENOUS at 08:45

## 2023-11-30 RX ADMIN — SENNOSIDES AND DOCUSATE SODIUM 1 TABLET: 8.6; 5 TABLET ORAL at 20:06

## 2023-11-30 RX ADMIN — OXYCODONE HYDROCHLORIDE 5 MG: 5 TABLET ORAL at 14:51

## 2023-11-30 RX ADMIN — PHENYLEPHRINE HYDROCHLORIDE 100 MCG: 10 INJECTION INTRAVENOUS at 09:17

## 2023-11-30 RX ADMIN — Medication 30 MG: at 10:37

## 2023-11-30 RX ADMIN — Medication 50 MG: at 08:29

## 2023-11-30 RX ADMIN — FENTANYL CITRATE 100 MCG: 50 INJECTION INTRAMUSCULAR; INTRAVENOUS at 07:49

## 2023-11-30 RX ADMIN — Medication 10 MG: at 11:11

## 2023-11-30 RX ADMIN — HYDROMORPHONE HYDROCHLORIDE 0.5 MG: 1 INJECTION, SOLUTION INTRAMUSCULAR; INTRAVENOUS; SUBCUTANEOUS at 11:25

## 2023-11-30 RX ADMIN — METHOCARBAMOL 750 MG: 750 TABLET ORAL at 20:06

## 2023-11-30 RX ADMIN — ONDANSETRON 4 MG: 2 INJECTION INTRAMUSCULAR; INTRAVENOUS at 11:11

## 2023-11-30 RX ADMIN — ONDANSETRON 4 MG: 2 INJECTION INTRAMUSCULAR; INTRAVENOUS at 15:36

## 2023-11-30 RX ADMIN — Medication 2 G: at 08:15

## 2023-11-30 RX ADMIN — Medication 30 MG: at 10:03

## 2023-11-30 RX ADMIN — HYDROMORPHONE HYDROCHLORIDE 0.2 MG: 0.2 INJECTION, SOLUTION INTRAMUSCULAR; INTRAVENOUS; SUBCUTANEOUS at 16:31

## 2023-11-30 RX ADMIN — LABETALOL HYDROCHLORIDE 40 MG: 5 INJECTION, SOLUTION INTRAVENOUS at 15:29

## 2023-11-30 RX ADMIN — Medication 50 MG: at 07:52

## 2023-11-30 RX ADMIN — FENTANYL CITRATE 50 MCG: 50 INJECTION INTRAMUSCULAR; INTRAVENOUS at 11:20

## 2023-11-30 RX ADMIN — PHENYLEPHRINE HYDROCHLORIDE 100 MCG: 10 INJECTION INTRAVENOUS at 08:47

## 2023-11-30 RX ADMIN — PROPOFOL 200 MG: 10 INJECTION, EMULSION INTRAVENOUS at 07:51

## 2023-11-30 RX ADMIN — ACETAMINOPHEN 975 MG: 325 TABLET, FILM COATED ORAL at 14:35

## 2023-11-30 RX ADMIN — ANTACID TABLETS 500 MG: 500 TABLET, CHEWABLE ORAL at 22:10

## 2023-11-30 RX ADMIN — PHENYLEPHRINE HYDROCHLORIDE 100 MCG: 10 INJECTION INTRAVENOUS at 10:40

## 2023-11-30 RX ADMIN — LABETALOL HYDROCHLORIDE 40 MG: 5 INJECTION, SOLUTION INTRAVENOUS at 22:10

## 2023-11-30 ASSESSMENT — VISUAL ACUITY
OU: BLURRED VISION

## 2023-11-30 ASSESSMENT — ACTIVITIES OF DAILY LIVING (ADL)
ADLS_ACUITY_SCORE: 18
ADLS_ACUITY_SCORE: 22
ADLS_ACUITY_SCORE: 18
ADLS_ACUITY_SCORE: 22
ADLS_ACUITY_SCORE: 22
ADLS_ACUITY_SCORE: 18
ADLS_ACUITY_SCORE: 22

## 2023-11-30 NOTE — OP NOTE
PATIENT NAME: Hong Kramer  PATIENT MRN: [id number]   PATIENT ACCOUNT: [id number]   PATIENT YOB: 1975     NEUROSURGERY OPERATIVE REPORT     DATE OF SURGERY: 11/30/23     PREOPERATIVE DIAGNOSIS:  1. trigeminal neuralgia     POSTOPERATIVE DIAGNOSIS:  1. trigeminal neuralgia     PROCEDURES PERFORMED:  1. left microvascular decompression, left - Head      STAFF SURGEON: Jairo Gonzales MD     RESIDENT SURGEONS: Glenis Parham MD     ANESTHESIA: General endotracheal anesthesia     ESTIMATED BLOOD LOSS: 100 mL     EXPLANTS: None     DRAINS: None     FINDINGS:  Compression of trigeminal nerve. Good decompression w ally at nerve root entry zone from AICA. No CSF leak from dural closure w 4-0 nuralon and duraseal. Skin closed with 2-0 vicryl and 3-0 nylon. Signals normal by the end of case.     COMPLICATIONS: None     INDICATIONS: Aaron Sanders is a 48-year-old gentleman from [place].  He has a history of left-sided trigeminal neuralgia.  In the past this has been treated with Trileptal.  He has had a very severe exacerbation at this point in time medication is no longer working and he is seeking a surgical treatment. MRI demonstrating compression of the trigeminal nerve.  Microvascular decompression was thoroughly discussed with the patient by Dr. Gonzales and the patient provided informed consent.     DESCRIPTION OF PROCEDURE:  The patient was brought to the operating room and intubated by Anesthesia.  Mott catheter and arterial line were placed, and neuromonitoring with BAERs was established.  The patient was positioned in lateral park bench position with left side up and arms extended in front, with head secured in Nelson head clamp, exposing the retroauricular region.  A trapdoor retroauricular incision was planned and marked based on anatomic surface landmarks indicating the transverse-sigmoid junction posterior to the right ear.  This area was shaved, prepped, and draped in standard fashion.  Local  anesthetic was injected.  Time-out was conducted.     Incision was made through epidermis and dermis with #10 blade.  Monopolar cautery was used to dissect down to bone, while preserving the occipital and posterior auricular arteries.  The myocutaneous flap was reflected inferiorly and held in place with fish hooks.  Bone wax was used to control bone bleeding from emissary veins.     Using a 5 mm cutting teena, we began drilling at the posterior and inferior aspect of the suspected location of the transverse-sigmoid junction.  We continued drilling down to dura and then continued superiorly and anteriorly until the transverse and sigmoid sinuses were reached.  Care was taken during drilling to avoid injury to the sinuses.  Bony bleeding from emissary veins was controlled with bone wax.     With the transverse-sigmoid junction drilled out and clearly identified, the dura was opened sharply with #15 blade and Metzenbaum scissors in C-shaped fashion along the medial trajectory of the sinuses.  The dura was tacked up with 4-0 nurolon suture to maximize exposure of the trajectory toward CN V along the tentorium.       The operative microscope was draped and brought over the field.  We first aspirated CSF from along along the cerebellar-petrous space, allowing some decompression and further retraction of the cerebellum.  We then encountered cranial nerves 7 and 8, and arachnoid adhesions here were lysed with microscissors to allow further aspiration of CSF.  Medial and superior to this location, we identified the nerve root origin on cranial nerve 5, and we saw. AICA was found compressing the root entry zone and it was decompressed by ally.     We waxed the air cells prior to closure. The dura was closed with interrupted 4-0 nurolon suture in an interrupted fashion.  Valsalva was performed, and no CSF leakage was detected. Dura seal was thereafter applied to the exposed dura.     Thereafter, Synthes Stratec mesh was  secured over the craniectomy site with 4 mm screws.  The operative sitewas irrigated and then closed in layers with inverted, interrupted 2-0 vicryl for the galea and 3-0 running nylon for the skin. The wound was then cleaned with wet and dry gauze.  Bacitracin and sterile dressings were applied. The patient was extubated and returned to the PACU without incident. At the end of the procedure, sponge and needle counts were correct.      Dr. Gonzales was present and scrubbed for the critical portions of the procedure and immediately available for the remainder.

## 2023-11-30 NOTE — ANESTHESIA CARE TRANSFER NOTE
Patient: Hong Kramer    Procedure: Procedure(s):  Left microvascular decompression       Diagnosis: Trigeminal neuralgia [G50.0]  Diagnosis Additional Information: No value filed.    Anesthesia Type:   General     Note:    Oropharynx: oropharynx clear of all foreign objects and spontaneously breathing  Level of Consciousness: drowsy  Oxygen Supplementation: face mask  Level of Supplemental Oxygen (L/min / FiO2): 6  Independent Airway: airway patency satisfactory and stable  Dentition: dentition unchanged  Vital Signs Stable: post-procedure vital signs reviewed and stable  Report to RN Given: handoff report given  Patient transferred to: PACU    Handoff Report: Identifed the Patient, Identified the Reponsible Provider, Reviewed the pertinent medical history, Discussed the surgical course, Reviewed Intra-OP anesthesia mangement and issues during anesthesia, Set expectations for post-procedure period and Allowed opportunity for questions and acknowledgement of understanding      Vitals:  Vitals Value Taken Time   /94 11/30/23 1251   Temp     Pulse 92 11/30/23 1255   Resp 18 11/30/23 1255   SpO2 100 % 11/30/23 1255   Vitals shown include unfiled device data.    Electronically Signed By: HARPREET Feliciano CRNA  November 30, 2023  12:56 PM

## 2023-11-30 NOTE — PROGRESS NOTES
Paged Dr Parham regarding new tingling in lower lip. Facial and peripheral sensation intact other than lower lip tingling.

## 2023-11-30 NOTE — ANESTHESIA PROCEDURE NOTES
Airway       Patient location during procedure: OR       Procedure Start/Stop Times: 11/30/2023 7:55 AM  Staff -        CRNA: Harry Murphy APRN CRNA       Performed By: CRNA  Consent for Airway        Urgency: elective  Indications and Patient Condition       Indications for airway management: raúl-procedural       Induction type:intravenous       Mask difficulty assessment: 2 - vent by mask + OA or adjuvant +/- NMBA    Final Airway Details       Final airway type: endotracheal airway       Successful airway: ETT - single  Endotracheal Airway Details        ETT size (mm): 7.5       Cuffed: yes       Successful intubation technique: direct laryngoscopy       DL Blade Type: Barnard 2       Grade View of Cords: 1       Adjucts: stylet       Position: Right       Measured from: gums/teeth       Secured at (cm): 23       Bite block used: None    Post intubation assessment        Placement verified by: capnometry, equal breath sounds and chest rise        Number of attempts at approach: 2       Number of other approaches attempted: 0       Secured with: pink tape       Ease of procedure: easy       Dentition: Intact and Unchanged    Medication(s) Administered   Medication Administration Time: 11/30/2023 7:55 AM

## 2023-11-30 NOTE — ANESTHESIA PROCEDURE NOTES
Arterial Line Procedure Note    Pre-Procedure   Staff -        Anesthesiologist:  Alma Cobb MD       Performed By: anesthesiologist       Location: OR  Line Placement:   This line was placed Post Induction  Procedure   Procedure: arterial line       Laterality: left       Insertion Site: radial.  Sterile Prep        Standard elements of sterile barrier followed       Skin prep: Chloraprep  Insertion/Injection        Technique: Seldinger Technique        Catheter Type/Size: 20 G, 1.75 in/4.5 cm quick cath (integral wire)  Narrative        Tegaderm dressing used.       Complications: None apparent,        Arterial waveform: Yes        IBP within 10% of NIBP: Yes

## 2023-11-30 NOTE — BRIEF OP NOTE
"[facility]    Brief Operative Note    Pre-operative diagnosis: Trigeminal neuralgia [G50.0]  Post-operative diagnosis Same as pre-operative diagnosis    Procedure: Left microvascular decompression, Left - Head    Surgeon: Surgeon(s) and Role:     * Jairo Gonzales MD - Primary     * Glenis Parham MD - Resident - Assisting  Anesthesia: General   Estimated Blood Loss: Less than 100 ml    Drains: None  Specimens: * No specimens in log *  Findings:   Loops of AICA compressing root entry zone decompressed by the end of the procedure  Complications: None.  Implants:   Implant Name Type Inv. Item Serial No.  Lot No. LRB No. Used Action   GRAFT PTFE FELT PLEDGET 1X1\" [id number] - [id number] Graft GRAFT PTFE FELT PLEDGET 1X1\" [id number]  [employer] [id number] Left 1 Wasted   GRAFT PTFE FELT PLEDGET 1X1\" [id number] - [id number] Graft GRAFT PTFE FELT PLEDGET 1X1\" [id number]  [employer] [id number] Left 1 Implanted   IMP MESH SYN MATRIX NEURO CONTOUR [id number] 04.503.081 - [id number] Metallic Hardware/Donahue IMP MESH SYN MATRIX NEURO CONTOUR [id number] [id number]  SYNTHES[employer]STRATEC NA Left 1 Implanted   IMP SCR SYN MATRIX LOW PRO 1.5X04MM SELF DRILL 04.503.104.01 - [id number] Metallic Hardware/Donahue IMP SCR SYN MATRIX LOW PRO 1.5X04MM SELF DRILL [id number]  SYNTHES-STRATEC NA Left 7 Implanted             "

## 2023-11-30 NOTE — PROGRESS NOTES
Admitted/transferred from: PACU  Reason for admission/transfer: frequent neuro checks, post op  2 RN skin assessment: completed by aGyle DIANE and Saundra ZIMMERMAN  Result of skin assessment and interventions/actions: Posterior crani site covered with primapore  Height, weight, drug calc weight: Done  Patient belongings: clothing, shoes  MDRO education added to care planN/A  ?

## 2023-11-30 NOTE — ANESTHESIA POSTPROCEDURE EVALUATION
Patient: Hong Kramer    Procedure: Procedure(s):  Left microvascular decompression       Anesthesia Type:  General    Note:  Disposition: Admission; Inpatient   Postop Pain Control: Uneventful            Sign Out: Well controlled pain   PONV: No   Neuro/Psych: Uneventful            Sign Out: Acceptable/Baseline neuro status   Airway/Respiratory: Uneventful            Sign Out: Acceptable/Baseline resp. status   CV/Hemodynamics: Uneventful            Sign Out: Acceptable CV status; No obvious hypovolemia; No obvious fluid overload   Other NRE: NONE   DID A NON-ROUTINE EVENT OCCUR? No           Last vitals:  Vitals Value Taken Time   /97 11/30/23 1330   Temp 36.8  C (98.2  F) 11/30/23 1315   Pulse 79 11/30/23 1340   Resp 14 11/30/23 1340   SpO2 93 % 11/30/23 1340   Vitals shown include unfiled device data.    Electronically Signed By: Cedric Hammond MD  November 30, 2023  1:41 PM

## 2023-12-01 ENCOUNTER — APPOINTMENT (OUTPATIENT)
Dept: PHYSICAL THERAPY | Facility: CLINIC | Age: 48
DRG: 026 | End: 2023-12-01
Payer: COMMERCIAL

## 2023-12-01 ENCOUNTER — TELEPHONE (OUTPATIENT)
Dept: NEUROSURGERY | Facility: CLINIC | Age: 48
End: 2023-12-01
Payer: COMMERCIAL

## 2023-12-01 LAB
ANION GAP SERPL CALCULATED.3IONS-SCNC: 10 MMOL/L (ref 7–15)
ANION GAP SERPL CALCULATED.3IONS-SCNC: 9 MMOL/L (ref 7–15)
BUN SERPL-MCNC: 10.4 MG/DL (ref 6–20)
BUN SERPL-MCNC: 9.8 MG/DL (ref 6–20)
CALCIUM SERPL-MCNC: 8.9 MG/DL (ref 8.6–10)
CALCIUM SERPL-MCNC: 9 MG/DL (ref 8.6–10)
CHLORIDE SERPL-SCNC: 94 MMOL/L (ref 98–107)
CHLORIDE SERPL-SCNC: 95 MMOL/L (ref 98–107)
CREAT SERPL-MCNC: 0.82 MG/DL (ref 0.67–1.17)
CREAT SERPL-MCNC: 0.87 MG/DL (ref 0.67–1.17)
DEPRECATED HCO3 PLAS-SCNC: 25 MMOL/L (ref 22–29)
DEPRECATED HCO3 PLAS-SCNC: 26 MMOL/L (ref 22–29)
EGFRCR SERPLBLD CKD-EPI 2021: >90 ML/MIN/1.73M2
EGFRCR SERPLBLD CKD-EPI 2021: >90 ML/MIN/1.73M2
ERYTHROCYTE [DISTWIDTH] IN BLOOD BY AUTOMATED COUNT: 12 % (ref 10–15)
GLUCOSE SERPL-MCNC: 104 MG/DL (ref 70–99)
GLUCOSE SERPL-MCNC: 126 MG/DL (ref 70–99)
HCT VFR BLD AUTO: 38.6 % (ref 40–53)
HGB BLD-MCNC: 13.9 G/DL (ref 13.3–17.7)
MAGNESIUM SERPL-MCNC: 2.2 MG/DL (ref 1.7–2.3)
MCH RBC QN AUTO: 32.3 PG (ref 26.5–33)
MCHC RBC AUTO-ENTMCNC: 36 G/DL (ref 31.5–36.5)
MCV RBC AUTO: 90 FL (ref 78–100)
OSMOLALITY SERPL: 266 MMOL/KG (ref 275–295)
OSMOLALITY UR: 441 MMOL/KG (ref 100–1200)
PHOSPHATE SERPL-MCNC: 3.9 MG/DL (ref 2.5–4.5)
PLATELET # BLD AUTO: 304 10E3/UL (ref 150–450)
POTASSIUM SERPL-SCNC: 4.3 MMOL/L (ref 3.4–5.3)
POTASSIUM SERPL-SCNC: 4.4 MMOL/L (ref 3.4–5.3)
RBC # BLD AUTO: 4.3 10E6/UL (ref 4.4–5.9)
SODIUM SERPL-SCNC: 129 MMOL/L (ref 135–145)
SODIUM SERPL-SCNC: 130 MMOL/L (ref 135–145)
SODIUM SERPL-SCNC: 130 MMOL/L (ref 135–145)
SODIUM UR-SCNC: 114 MMOL/L
WBC # BLD AUTO: 12.7 10E3/UL (ref 4–11)

## 2023-12-01 PROCEDURE — 97116 GAIT TRAINING THERAPY: CPT | Mod: GP

## 2023-12-01 PROCEDURE — 83935 ASSAY OF URINE OSMOLALITY: CPT

## 2023-12-01 PROCEDURE — 250N000013 HC RX MED GY IP 250 OP 250 PS 637

## 2023-12-01 PROCEDURE — 84100 ASSAY OF PHOSPHORUS: CPT

## 2023-12-01 PROCEDURE — 97530 THERAPEUTIC ACTIVITIES: CPT | Mod: GP

## 2023-12-01 PROCEDURE — 85027 COMPLETE CBC AUTOMATED: CPT

## 2023-12-01 PROCEDURE — 36415 COLL VENOUS BLD VENIPUNCTURE: CPT

## 2023-12-01 PROCEDURE — 250N000011 HC RX IP 250 OP 636: Mod: JZ

## 2023-12-01 PROCEDURE — 84295 ASSAY OF SERUM SODIUM: CPT

## 2023-12-01 PROCEDURE — 82310 ASSAY OF CALCIUM: CPT

## 2023-12-01 PROCEDURE — 97161 PT EVAL LOW COMPLEX 20 MIN: CPT | Mod: GP

## 2023-12-01 PROCEDURE — 84300 ASSAY OF URINE SODIUM: CPT

## 2023-12-01 PROCEDURE — 83735 ASSAY OF MAGNESIUM: CPT

## 2023-12-01 PROCEDURE — 999N000111 HC STATISTIC OT IP EVAL DEFER

## 2023-12-01 PROCEDURE — 83930 ASSAY OF BLOOD OSMOLALITY: CPT

## 2023-12-01 PROCEDURE — 200N000002 HC R&B ICU UMMC

## 2023-12-01 RX ADMIN — OXCARBAZEPINE 300 MG: 300 TABLET, FILM COATED ORAL at 19:35

## 2023-12-01 RX ADMIN — METHOCARBAMOL 750 MG: 750 TABLET ORAL at 11:29

## 2023-12-01 RX ADMIN — METHOCARBAMOL 750 MG: 750 TABLET ORAL at 07:08

## 2023-12-01 RX ADMIN — ACETAMINOPHEN 975 MG: 325 TABLET, FILM COATED ORAL at 15:30

## 2023-12-01 RX ADMIN — OXYCODONE HYDROCHLORIDE 5 MG: 5 TABLET ORAL at 09:45

## 2023-12-01 RX ADMIN — CEFAZOLIN SODIUM 2 G: 2 INJECTION, SOLUTION INTRAVENOUS at 07:07

## 2023-12-01 RX ADMIN — CEFAZOLIN SODIUM 2 G: 2 INJECTION, SOLUTION INTRAVENOUS at 00:19

## 2023-12-01 RX ADMIN — SENNOSIDES AND DOCUSATE SODIUM 1 TABLET: 8.6; 5 TABLET ORAL at 07:08

## 2023-12-01 RX ADMIN — METHOCARBAMOL 750 MG: 750 TABLET ORAL at 15:30

## 2023-12-01 RX ADMIN — CEFAZOLIN SODIUM 2 G: 2 INJECTION, SOLUTION INTRAVENOUS at 15:30

## 2023-12-01 RX ADMIN — OXCARBAZEPINE 300 MG: 300 TABLET, FILM COATED ORAL at 07:10

## 2023-12-01 RX ADMIN — LISINOPRIL 5 MG: 2.5 TABLET ORAL at 07:08

## 2023-12-01 RX ADMIN — POLYETHYLENE GLYCOL 3350 17 G: 17 POWDER, FOR SOLUTION ORAL at 07:08

## 2023-12-01 RX ADMIN — ACETAMINOPHEN 975 MG: 325 TABLET, FILM COATED ORAL at 23:34

## 2023-12-01 RX ADMIN — OXYCODONE HYDROCHLORIDE 5 MG: 5 TABLET ORAL at 00:19

## 2023-12-01 RX ADMIN — OXYCODONE HYDROCHLORIDE 5 MG: 5 TABLET ORAL at 05:21

## 2023-12-01 RX ADMIN — AMLODIPINE BESYLATE 10 MG: 10 TABLET ORAL at 07:08

## 2023-12-01 RX ADMIN — OXYCODONE HYDROCHLORIDE 10 MG: 10 TABLET ORAL at 19:35

## 2023-12-01 RX ADMIN — OXYCODONE HYDROCHLORIDE 10 MG: 10 TABLET ORAL at 23:44

## 2023-12-01 RX ADMIN — ACETAMINOPHEN 975 MG: 325 TABLET, FILM COATED ORAL at 06:12

## 2023-12-01 RX ADMIN — METHOCARBAMOL 750 MG: 750 TABLET ORAL at 19:35

## 2023-12-01 RX ADMIN — OXYCODONE HYDROCHLORIDE 10 MG: 10 TABLET ORAL at 13:36

## 2023-12-01 RX ADMIN — CEFAZOLIN SODIUM 2 G: 2 INJECTION, SOLUTION INTRAVENOUS at 23:34

## 2023-12-01 ASSESSMENT — VISUAL ACUITY
OU: BLURRED VISION

## 2023-12-01 ASSESSMENT — ACTIVITIES OF DAILY LIVING (ADL)
ADLS_ACUITY_SCORE: 26
ADLS_ACUITY_SCORE: 22
ADLS_ACUITY_SCORE: 26
ADLS_ACUITY_SCORE: 26
ADLS_ACUITY_SCORE: 22
ADLS_ACUITY_SCORE: 26

## 2023-12-01 NOTE — TELEPHONE ENCOUNTER
[facility] Call Center    Phone Message    May a detailed message be left on voicemail: yes     Reason for Call: You will follow up with Neurosurgery [facility] in approximately 2 weeks.     Action Taken: Other: Routed to [facility] Adult [facility]    Travel Screening: Not Applicable

## 2023-12-01 NOTE — PROGRESS NOTES
"   12/01/23 1043   Appointment Info   Signing Clinician's Name / Credentials (PT) Deepthi Zapata PT, DPT   Rehab Comments (PT) crani   Living Environment   People in Home spouse;child(adeola), dependent   Current Living Arrangements house   Home Accessibility stairs to enter home;stairs within home   Number of Stairs, Main Entrance 3   Stair Railings, Main Entrance railings on both sides of stairs   Transportation Anticipated car, drives self;family or friend will provide   Living Environment Comments Pt lives with spouse and 19 yo son in single story home with 3STE. Works from home, spouse able to stay home prn at discharge. Father also lives nearby and can assist prn   Self-Care   Usual Activity Tolerance excellent   Current Activity Tolerance good   Regular Exercise No   Equipment Currently Used at Home none   Fall history within last six months no   Activity/Exercise/Self-Care Comment Pt reports IND with all mobility and ADLs at baseline. Works full time from home, generally active.   General Information   Onset of Illness/Injury or Date of Surgery 11/30/23   Referring Physician Jeimy Kennedy MD   Patient/Family Therapy Goals Statement (PT) return home   Pertinent History of Current Problem (include personal factors and/or comorbidities that impact the POC) per EMR \"48-year-old gentleman with history of left V3 trigeminal neuralgia on treatment with Trileptal and recent severe exacerbation. Now post operative day 1 status post left MVD. \"   Existing Precautions/Restrictions   (s/p crani)   General Observations pleasant and motivated to dc home, nevous about mobilit   Cognition   Affect/Mental Status (Cognition) WFL;low arousal/lethargic   Pain Assessment   Patient Currently in Pain Yes, see Vital Sign flowsheet  (\"neck stiffness\")   Integumentary/Edema   Integumentary/Edema Comments consistent with procudure, L side neck/lateral incision   Posture    Posture Comments rigid neck and upper T spine mobility, " reports feeling very stiff   Range of Motion (ROM)   ROM Comment limited neck ROM and shoulder ROM 2/2 pain/stiffness 2/2 location of incision   Strength (Manual Muscle Testing)   Strength Comments Functional per mobility screen   Bed Mobility   Comment, (Bed Mobility) SBA supine > sit with HOB elevated   Transfers   Comment, (Transfers) STS from EOB SBA no UE support   Gait/Stairs (Locomotion)   Comment, (Gait/Stairs) SBA in room, no overt LOB, slower pace per baseline   Balance   Balance Comments impaired higher level balance, unable to scan environment   Sensory Examination   Sensory Perception Comments denies changes   Coordination   Coordination no deficits were identified   Clinical Impression   Criteria for Skilled Therapeutic Intervention Yes, treatment indicated   PT Diagnosis (PT) impaired functional mobility   Influenced by the following impairments impaired higher level balance, post op precautions, pain   Functional limitations due to impairments IND with transfers, gait, stairs   Clinical Presentation (PT Evaluation Complexity) stable   Clinical Presentation Rationale clinical judgement   Clinical Decision Making (Complexity) low complexity   Planned Therapy Interventions (PT) balance training;bed mobility training;gait training;home exercise program;motor coordination training;neuromuscular re-education;patient/family education;ROM (range of motion);strengthening;stair training;transfer training;progressive activity/exercise;risk factor education;home program guidelines   Risk & Benefits of therapy have been explained evaluation/treatment results reviewed;care plan/treatment goals reviewed;risks/benefits reviewed;current/potential barriers reviewed;participants voiced agreement with care plan;participants included;patient   PT Total Evaluation Time   PT Eval, Low Complexity Minutes (89738) 8   Physical Therapy Goals   PT Frequency 6x/week   PT Predicted Duration/Target Date for Goal Attainment  12/31/23   PT Goals Bed Mobility;Transfers;Gait;Stairs   PT: Bed Mobility Independent;Supine to/from sit;Rolling;Within precautions   PT: Transfers Independent;Sit to/from stand;Bed to/from chair;Within precautions   PT: Gait Independent;Greater than 200 feet;Within precautions   PT: Stairs Supervision/stand-by assist;3 stairs;Rail on both sides   PT Discharge Planning   PT Plan progress gait, stairs, d/c concerns   PT Discharge Recommendation (DC Rec) home with assist;home with outpatient physical therapy   PT Rationale for DC Rec Pt mobilizing well post op. has strong support from family. Anticipate when medically ready, pt will be safe to dc home with family support. recommend OP PT follow up for any ongoing pain, balance, and neck ROM concerns   PT Brief overview of current status SBA   Total Session Time   Total Session Time (sum of timed and untimed services) 8

## 2023-12-01 NOTE — DISCHARGE SUMMARY
"[facility] Discharge Summary and Instructions    Hong Kramer MRN# [id number]   Age: 48 year old YOB: 1975     Date of Admission:  11/30/2023  Date of Discharge::  12/2/2023  Admitting Physician:  Jairo Gonzales MD  Discharge Physician:  Jairo Gonzales MD          Admission Diagnoses:   Trigeminal neuralgia [G50.0]          Discharge Diagnosis:     Trigeminal neuralgia [G50.0]     Clinically Significant Risk Factors Present on Admission            # Overweight: Estimated body mass index is 28.03 kg/m  as calculated from the following:    Height as of this encounter: 1.778 m (5' 10\").    Weight as of this encounter: 88.6 kg (195 lb 5.2 oz).           Procedures:   11/30/23 - left microvascular decompression, left - Head             Brief History of Illness:   Briefly Marilyn is a 48-year-old gentleman from [place].  He has a history of left-sided trigeminal neuralgia.  In the past this has been treated with Trileptal.  He has had a very severe exacerbation at this point in time medication is no longer working and he is seeking a surgical treatment. MRI demonstrating compression of the trigeminal nerve.  Microvascular decompression was thoroughly discussed with the patient by Dr. Gonzales and the patient provided informed consent.  Patient has elected to undergo above-mentioned procedure.           Hospital Course:   Patient underwent above-mentioned procedure on 11/30/23.  Following the procedure the patient was transferred to ICU.  The patient endorsed some left sided neck pain but denied any changes in sensation or hearing. He denies any further episodes of trigeminal face pain. The patient's course was complicated by hyponatremia, which appeared to be chronic. Urine labs suggestive of SIADH, therefore patient started on 1.2 L fluid restriction. Sodium improved to 130.  On post operative day 1 he was doing well and transferred to the floor. On post operative day 2, he was " "ambulating, voiding without a sood, eating a regular diet, pain was well controlled and therefore he was discharged home.          Discharge Medications:     Current Discharge Medication List        START taking these medications    Details   acetaminophen (TYLENOL) 325 MG tablet Take 2 tablets (650 mg) by mouth every 4 hours as needed for headaches or pain (For optimal non-opioid multimodal pain management to improve pain control.)  Qty: 60 tablet, Refills: 1    Associated Diagnoses: Status post craniotomy      methocarbamol (ROBAXIN) 750 MG tablet Take 1 tablet (750 mg) by mouth 3 times daily as needed for muscle spasms  Qty: 40 tablet, Refills: 0    Associated Diagnoses: Status post craniotomy      ondansetron (ZOFRAN ODT) 4 MG ODT tab Take 1 tablet (4 mg) by mouth every 6 hours as needed for nausea or vomiting  Qty: 16 tablet, Refills: 0    Associated Diagnoses: Status post craniotomy      oxyCODONE (ROXICODONE) 5 MG tablet Take 1 tablet (5 mg) by mouth every 6 hours as needed for moderate pain  Qty: 16 tablet, Refills: 0    Associated Diagnoses: Status post craniotomy      senna-docusate (SENOKOT-S/PERICOLACE) 8.6-50 MG tablet Take 1 tablet by mouth 2 times daily for 15 days  Qty: 30 tablet, Refills: 0    Associated Diagnoses: Status post craniotomy           CONTINUE these medications which have CHANGED    Details   OXcarbazepine (TRILEPTAL) 300 MG tablet Take 1 tablet (300 mg) by mouth 2 times daily  Qty: 60 tablet, Refills: 3    Associated Diagnoses: Trigeminal neuralgia           CONTINUE these medications which have NOT CHANGED    Details   amLODIPine (NORVASC) 10 MG tablet Take 10 mg by mouth daily      lisinopril (ZESTRIL) 5 MG tablet Take 5 mg by mouth daily                 Exam:   Physical Exam  /81 (BP Location: Right arm)   Pulse 65   Temp 98.1  F (36.7  C) (Oral)   Resp 10   Ht 1.778 m (5' 10\")   Wt 88.6 kg (195 lb 5.2 oz)   SpO2 99%   BMI 28.03 kg/m    Gen: Appears comfortable, " NAD  Wound: clean, dry, intact   Neurologic:  Alert & Oriented to person, place, time, and situation  Follows commands briskly  Speech fluent, spontaneous. No aphasia or dysarthria.  No gaze preference. No apparent hemineglect.  PERRL, EOMI  Face symmetric with sensation intact to light touch  Palate elevates symmetrically, uvula midline, tongue protrudes midline  Trapezii muscles 5/5 bilaterally  No pronator drift       Del Tr Bi WE WF Gr   R 5 5 5 5 5 5   L 5 5 5 5 5 5     HF KE KF DF PF EHL   R 5 5 5 5 5 5   L 5 5 5 5 5 5      Reflexes 2+ throughout     Sensation intact and symmetric to light touch throughout         Discharge Instructions and Follow-Up:     Discharge diet: Regular   Discharge activity: You may advance activity as tolerated. No strenuous exercise or heay lifting greater than 10 lbs for 4 weeks or until seen and cleared in clinic.     Discharge follow-up: Follow-up with Neurosurgery [facility] in 2 weeks for wound check/post-hospital evaluation.     Wound care: Ok to shower,however no scrubbing of the wound and no soaking of the wound, meaning no bathtubs or swimming pools. Pat dry only. Leave wound open to air.  Patient to have wound checked 2 weeks following surgery.    Wound location: Scalp  Closure technique: Suture  Dressing needs: Non  Post-op care at follow-up: Keep dry and clean         Please call if you have:  1. increased pain, redness, drainage, swelling at your incision  2. fevers > 101.5 F degrees  3. with any questions or concerns.  You may reach the Neurosurgery clinic at [phone] during regular work hours. ER at [phone].    and ask for the Neurosurgery Resident on call at [phone], during off hours or weekends.         Discharge Disposition:     Discharged to home        Briana Schroeder PA-C  Neurosurgery Department  Pager: [phone]    Audrey Chapa MD, PhD  PGY-2 Neurosurgery    Please contact neurosurgery resident on call with questions.    Dial * * [phone],  enter [phone] when prompted.

## 2023-12-01 NOTE — PROGRESS NOTES
I cared for Pt from 1100 to 1515    Major Shift Events:  Neuro assessment WDL. Vital Signs stable. Pain controlled with PRN oxycodone and scheduled meds. Up in chair for multiple hours. Walked in [place]. Tolerating regular diet.   Plan: Transfer to  when bed available, discharge tomorrow (12/2) morning.     For vital signs and complete assessments, please see documentation flowsheets.

## 2023-12-01 NOTE — PROGRESS NOTES
"[facility], [place]  Neurosurgery Progress Note:  12/01/2023      Interval History: OR for left microvascular decompression. No acute events overnight. Ongoing hyponatremia. Trileptal dose decreased, on fluid restriction.    Assessment:  48-year-old gentleman with history of left V3 trigeminal neuralgia on treatment with Trileptal and recent severe exacerbation.    Now post operative day 1 status post left MVD.       Clinically Significant Risk Factors         # Hyponatremia: Lowest Na = 122 mmol/L in last 2 days, will monitor as appropriate                 # Overweight: Estimated body mass index is 28.03 kg/m  as calculated from the following:    Height as of this encounter: 1.778 m (5' 10\").    Weight as of this encounter: 88.6 kg (195 lb 5.2 oz)., PRESENT ON ADMISSION             Plan:  Serial neuro exams  Pain control  HOB > 30 degrees  SBP < 140  Advance diet as tolerated  Bowel regimen  PRN antiemetics  Fluid restriction 1.2L, Na q6h  Plan to discuss hyponatremia with Medicine today  Continue Trileptal at 300mg BID for now  PT/OT  SCDs for DVT proph  Transfer to floor, possible discharge home today    -----------------------------------  Malu Santos MD  Neurosurgery resident, PGY-3  -----------------------------------  Gen: Appears comfortable, NAD  Wound: clean, dry, intact   Neurologic:  Alert & Oriented to person, place, time, and situation  Follows commands briskly  Speech fluent, spontaneous. No aphasia or dysarthria.  No gaze preference. No apparent hemineglect.  PERRL, EOMI  Face symmetric with sensation intact to light touch  Palate elevates symmetrically, uvula midline, tongue protrudes midline  Trapezii muscles 5/5 bilaterally  No pronator drift     Del Tr Bi WE WF Gr   R 5 5 5 5 5 5   L 5 5 5 5 5 5    HF KE KF DF PF EHL   R 5 5 5 5 5 5   L 5 5 5 5 5 5     Reflexes 2+ throughout    Sensation intact and symmetric to light touch throughout    Objective:   Temp:  [96.6 "  F (35.9  C)-98.4  F (36.9  C)] 98.3  F (36.8  C)  Pulse:  [62-92] 62  Resp:  [10-25] 14  BP: ()/(49-99) 117/76  MAP:  [77 mmHg-163 mmHg] 105 mmHg  Arterial Line BP: ()/() 110/99  SpO2:  [94 %-100 %] 97 %  I/O last 3 completed shifts:  In: 3185.21 [P.O.:400; I.V.:2785.21]  Out: 890 [Urine:890]        LABS:  Recent Labs   Lab 11/30/23  2214 11/30/23  1456 11/30/23  1447 11/30/23  0628   * 123*  --  125*   POTASSIUM  --  4.4  --  4.7   CHLORIDE  --  92*  --  91*   CO2  --  22  --  24   ANIONGAP  --  9  --  10   GLC  --  155* 176* 109*   BUN  --  11.2  --  9.7   CR  --  0.73  --  0.79   FAVIAN  --  8.5*  --  9.0       Recent Labs   Lab 11/30/23  1456   WBC 12.8*   RBC 4.41   HGB 13.7   HCT 37.3*   MCV 85   MCH 31.1   MCHC 36.7*   RDW 11.9          IMAGING:  No results found for this or any previous visit (from the past 24 hour(s)).

## 2023-12-01 NOTE — PLAN OF CARE
Major Shift Events:  Alert and oriented x4, able to make needs known. Strengths equal bilaterally. C/O intermittent double vision when waking up, some numbness present in lower lip but is improving. STEVIE. Sinus rhythm. Systolic goal less than 140, nicardipine off around 2200, 1 dose of PRN labetalol given to maintain BP goals. Afebrile. LS clear, on RA. Regular diet with 1.2 L fluid restriction. Uses urinal with adequate urine output.   Plan: Transfer to  pending bed availability. Continue with plan of care and notify team of any acute changes.  For vital signs and complete assessments, please see documentation flowsheets.

## 2023-12-01 NOTE — PLAN OF CARE
Occupational Therapy: Orders received. Chart reviewed and discussed with care team.? Occupational Therapy not indicated at this time as pt at baseline w/ ADL completion. Assist available if needed for heavy ADL/IADL.? Defer discharge recommendations to PT.? Will complete orders. Please re-consult if further needs are identified or if pt experiences a change in status.

## 2023-12-02 ENCOUNTER — APPOINTMENT (OUTPATIENT)
Dept: PHYSICAL THERAPY | Facility: CLINIC | Age: 48
DRG: 026 | End: 2023-12-02
Attending: NEUROLOGICAL SURGERY
Payer: COMMERCIAL

## 2023-12-02 VITALS
DIASTOLIC BLOOD PRESSURE: 94 MMHG | OXYGEN SATURATION: 98 % | SYSTOLIC BLOOD PRESSURE: 144 MMHG | BODY MASS INDEX: 27.96 KG/M2 | HEIGHT: 70 IN | HEART RATE: 76 BPM | TEMPERATURE: 98 F | RESPIRATION RATE: 16 BRPM | WEIGHT: 195.33 LBS

## 2023-12-02 LAB
ANION GAP SERPL CALCULATED.3IONS-SCNC: 9 MMOL/L (ref 7–15)
BUN SERPL-MCNC: 10.2 MG/DL (ref 6–20)
CALCIUM SERPL-MCNC: 9.4 MG/DL (ref 8.6–10)
CHLORIDE SERPL-SCNC: 93 MMOL/L (ref 98–107)
CREAT SERPL-MCNC: 0.86 MG/DL (ref 0.67–1.17)
DEPRECATED HCO3 PLAS-SCNC: 28 MMOL/L (ref 22–29)
EGFRCR SERPLBLD CKD-EPI 2021: >90 ML/MIN/1.73M2
ERYTHROCYTE [DISTWIDTH] IN BLOOD BY AUTOMATED COUNT: 12.1 % (ref 10–15)
GLUCOSE BLDC GLUCOMTR-MCNC: 115 MG/DL (ref 70–99)
GLUCOSE SERPL-MCNC: 108 MG/DL (ref 70–99)
HCT VFR BLD AUTO: 41.6 % (ref 40–53)
HGB BLD-MCNC: 14.5 G/DL (ref 13.3–17.7)
MAGNESIUM SERPL-MCNC: 2.2 MG/DL (ref 1.7–2.3)
MCH RBC QN AUTO: 31.6 PG (ref 26.5–33)
MCHC RBC AUTO-ENTMCNC: 34.9 G/DL (ref 31.5–36.5)
MCV RBC AUTO: 91 FL (ref 78–100)
PHOSPHATE SERPL-MCNC: 3.8 MG/DL (ref 2.5–4.5)
PLATELET # BLD AUTO: 302 10E3/UL (ref 150–450)
POTASSIUM SERPL-SCNC: 4.5 MMOL/L (ref 3.4–5.3)
RBC # BLD AUTO: 4.59 10E6/UL (ref 4.4–5.9)
SODIUM SERPL-SCNC: 130 MMOL/L (ref 135–145)
WBC # BLD AUTO: 12 10E3/UL (ref 4–11)

## 2023-12-02 PROCEDURE — 97116 GAIT TRAINING THERAPY: CPT | Mod: GP

## 2023-12-02 PROCEDURE — 84100 ASSAY OF PHOSPHORUS: CPT | Performed by: NEUROLOGICAL SURGERY

## 2023-12-02 PROCEDURE — 83735 ASSAY OF MAGNESIUM: CPT | Performed by: NEUROLOGICAL SURGERY

## 2023-12-02 PROCEDURE — 97530 THERAPEUTIC ACTIVITIES: CPT | Mod: GP

## 2023-12-02 PROCEDURE — 250N000013 HC RX MED GY IP 250 OP 250 PS 637

## 2023-12-02 PROCEDURE — 36415 COLL VENOUS BLD VENIPUNCTURE: CPT

## 2023-12-02 PROCEDURE — 85027 COMPLETE CBC AUTOMATED: CPT

## 2023-12-02 PROCEDURE — 80048 BASIC METABOLIC PNL TOTAL CA: CPT

## 2023-12-02 PROCEDURE — 250N000011 HC RX IP 250 OP 636: Mod: JZ

## 2023-12-02 RX ADMIN — AMLODIPINE BESYLATE 10 MG: 10 TABLET ORAL at 08:11

## 2023-12-02 RX ADMIN — OXYCODONE HYDROCHLORIDE 10 MG: 10 TABLET ORAL at 03:33

## 2023-12-02 RX ADMIN — ACETAMINOPHEN 975 MG: 325 TABLET, FILM COATED ORAL at 08:11

## 2023-12-02 RX ADMIN — OXCARBAZEPINE 300 MG: 300 TABLET, FILM COATED ORAL at 08:11

## 2023-12-02 RX ADMIN — OXYCODONE HYDROCHLORIDE 10 MG: 10 TABLET ORAL at 08:10

## 2023-12-02 RX ADMIN — METHOCARBAMOL 750 MG: 750 TABLET ORAL at 08:10

## 2023-12-02 RX ADMIN — CEFAZOLIN SODIUM 2 G: 2 INJECTION, SOLUTION INTRAVENOUS at 08:11

## 2023-12-02 RX ADMIN — SENNOSIDES AND DOCUSATE SODIUM 1 TABLET: 8.6; 5 TABLET ORAL at 08:12

## 2023-12-02 RX ADMIN — LISINOPRIL 5 MG: 2.5 TABLET ORAL at 08:10

## 2023-12-02 RX ADMIN — POLYETHYLENE GLYCOL 3350 17 G: 17 POWDER, FOR SOLUTION ORAL at 08:11

## 2023-12-02 ASSESSMENT — ACTIVITIES OF DAILY LIVING (ADL)
ADLS_ACUITY_SCORE: 26

## 2023-12-02 NOTE — PROGRESS NOTES
"[facility], [place]  Neurosurgery Progress Note:  12/02/2023      Interval History: no acute events overnight; Na stable off fluid restriction    Assessment:  48-year-old gentleman with history of left V3 trigeminal neuralgia on treatment with Trileptal and recent severe exacerbation.    Now post operative day 2 status post left MVD.       Clinically Significant Risk Factors         # Hyponatremia: Lowest Na = 122 mmol/L in last 2 days, will monitor as appropriate                 # Overweight: Estimated body mass index is 28.03 kg/m  as calculated from the following:    Height as of this encounter: 1.778 m (5' 10\").    Weight as of this encounter: 88.6 kg (195 lb 5.2 oz)., PRESENT ON ADMISSION             Plan:  Serial neuro exams  Pain control  HOB > 30 degrees  SBP < 140  Advance diet as tolerated  Bowel regimen  PRN antiemetics  Continue Trileptal at 300mg BID   PT/OT  Discharge home today    -----------------------------------  Malu Santos MD  Neurosurgery resident, PGY-3  -----------------------------------  Gen: Appears comfortable, NAD  Wound: clean, dry, intact   Neurologic:  Alert & Oriented to person, place, time, and situation  Follows commands briskly  Speech fluent, spontaneous. No aphasia or dysarthria.  No gaze preference. No apparent hemineglect.  PERRL, EOMI  Face symmetric with sensation intact to light touch  Palate elevates symmetrically, uvula midline, tongue protrudes midline  Trapezii muscles 5/5 bilaterally  No pronator drift     Del Tr Bi WE WF Gr   R 5 5 5 5 5 5   L 5 5 5 5 5 5    HF KE KF DF PF EHL   R 5 5 5 5 5 5   L 5 5 5 5 5 5     Reflexes 2+ throughout    Sensation intact and symmetric to light touch throughout    Objective:   Temp:  [97.6  F (36.4  C)-98.8  F (37.1  C)] 98.5  F (36.9  C)  Pulse:  [62-80] 80  Resp:  [10-20] 16  BP: (104-140)/(76-90) 132/87  SpO2:  [97 %-100 %] 100 %  I/O last 3 completed shifts:  In: 1225.5 [P.O.:940; I.V.:285.5]  Out: " 3675 [Urine:3675]        LABS:  Recent Labs   Lab 12/01/23  2200 12/01/23  1557 12/01/23  0955 12/01/23  0533 11/30/23  2214 11/30/23  1456   * 129* 129* 130*  130*   < > 123*   POTASSIUM  --  4.4  --  4.3  --  4.4   CHLORIDE  --  94*  --  95*  --  92*   CO2  --  26  --  25  --  22   ANIONGAP  --  9  --  10  --  9   GLC  --  126*  --  104*  --  155*   BUN  --  10.4  --  9.8  --  11.2   CR  --  0.87  --  0.82  --  0.73   FAVIAN  --  8.9  --  9.0  --  8.5*    < > = values in this interval not displayed.       Recent Labs   Lab 12/01/23  0533   WBC 12.7*   RBC 4.30*   HGB 13.9   HCT 38.6*   MCV 90   MCH 32.3   MCHC 36.0   RDW 12.0          IMAGING:  No results found for this or any previous visit (from the past 24 hour(s)).

## 2023-12-02 NOTE — PROGRESS NOTES
Brief Care Coordination Note    Patient with discharge orders this morning. PT is recommending outpatient PT. Attempted to call patient to complete care management assessment and inquire where he would like outpatient PT orders sent to as he lives in [place]. Patient has already been discharge from the hospital. Call placed to patient's personal phone, spoke with him and his wife who indicate they would like orders sent to [facility] in [facility] and provided phone and fax number. Orders faxed.    [facility] - physical therapy order faxed  Ph: [phone]  Fax: [phone]    Petra Retana, RN, BSN  6A RN Care Coordinator  Ph: [phone]   Pager: [phone]

## 2023-12-02 NOTE — PLAN OF CARE
Goal Outcome Evaluation:      Plan of Care Reviewed With: patient, spouse, family    Overall Patient Progress: improvingOverall Patient Progress: improving     Neuro assessment WDL. Vital signs stable. Incision CDI. Tolerating PO intake. Voiding spontaneously. Discharge teaching complete with Pt and Pt's wife and mom. Denies any further questions. Pt transported off of unit by Transport staff with family carrying all belongings.     For vital signs and complete assessments, please see documentation flowsheets.

## 2023-12-02 NOTE — PLAN OF CARE
Physical Therapy Discharge Summary    Reason for therapy discharge:    Discharged to home with outpatient therapy.    Progress towards therapy goal(s). See goals on Care Plan in [facility] electronic health record for goal details.  Goals partially met.  Barriers to achieving goals:   discharge from facility.    Therapy recommendation(s):    Continued therapy is recommended.  Rationale/Recommendations:  additional balance and ROM deficits post op.  Continue home exercise program.

## 2023-12-02 NOTE — PLAN OF CARE
"Major Shift Events:  Pt a/ox4. No neuro deficits, pt c/o of \"stiff neck\". Pain managed with oxy/tylenol. SBP goal<140. Room air. Reg diet. Drinking fluids, voids without difficulty, using urinal. No BM this shift. L incision CALLIE. Dried drainage, swelling on L side of neck below ear, soft, no sign of hematoma. MD notified of swelling. Ice pack applied. 1 PIV saline locked. Na check this am, lab came to collect around 6am.     Plan: Tx to 6A, plan for possible discharge today.     For vital signs and complete assessments, please see documentation flowsheets.      "

## 2023-12-06 NOTE — TELEPHONE ENCOUNTER
[facility] Call Center    Phone Message    May a detailed message be left on voicemail: yes     Reason for Call: Other: Patient is calling wondering if he will be having his sutures removed at his 2 week post op appointment and if this can be rescheduled. Please review and call back .     Action Taken: Message routed to:  [facility]    Travel Screening: Not Applicable

## 2023-12-07 ENCOUNTER — TELEPHONE (OUTPATIENT)
Dept: NEUROSURGERY | Facility: CLINIC | Age: 48
End: 2023-12-07
Payer: COMMERCIAL

## 2023-12-07 NOTE — TELEPHONE ENCOUNTER
[facility] Call Center    Phone Message    May a detailed message be left on voicemail: yes     Reason for Call: Other: Patient is needing to reschedule his appointment for 12/11 since he is not available that day. Please call back to reschedule right away.  tried to reschedule but nothing was showing available.        Action Taken: Other: Neurosurgery    Travel Screening: Not Applicable

## 2023-12-07 NOTE — TELEPHONE ENCOUNTER
Spoke with Hong,   Left neck discomfort, slightly swollen and numb in the back of head.  Incision clean dry closed.  States no facial pain, remains on Oxcarbazepine, will taper at post op appointment.    Requesting change in post op appointment.  Cancel 12/12 and can get ride on 12/13.  Appointment changed to 12/13 at noon with Radha Langford NP, note sent to scheduling.  Overbook, I can help if needed.    Voices understanding

## 2023-12-13 ENCOUNTER — OFFICE VISIT (OUTPATIENT)
Dept: NEUROSURGERY | Facility: CLINIC | Age: 48
End: 2023-12-13
Payer: COMMERCIAL

## 2023-12-13 VITALS
BODY MASS INDEX: 28.94 KG/M2 | WEIGHT: 195.4 LBS | RESPIRATION RATE: 16 BRPM | TEMPERATURE: 97.4 F | OXYGEN SATURATION: 99 % | HEART RATE: 91 BPM | HEIGHT: 69 IN | SYSTOLIC BLOOD PRESSURE: 136 MMHG | DIASTOLIC BLOOD PRESSURE: 84 MMHG

## 2023-12-13 DIAGNOSIS — G50.0 TRIGEMINAL NEURALGIA: Primary | ICD-10-CM

## 2023-12-13 PROCEDURE — 99024 POSTOP FOLLOW-UP VISIT: CPT | Performed by: NURSE PRACTITIONER

## 2023-12-13 ASSESSMENT — PAIN SCALES - GENERAL: PAINLEVEL: NO PAIN (0)

## 2023-12-13 NOTE — NURSING NOTE
"Chief Complaint   Patient presents with    Surgical Followup     /84 (BP Location: Left arm, Patient Position: Sitting, Cuff Size: Adult Regular)   Pulse 91   Resp 16   Ht 1.753 m (5' 9\")   Wt 88.6 kg (195 lb 6.4 oz)   SpO2 99%   BMI 28.86 kg/m      CELIA COCHRAN    "

## 2023-12-13 NOTE — PROGRESS NOTES
[facility]  Department of Neurosurgery      Name: Hong Kramer  MRN: [id number]  Age: 48 year old  : 1975  Referring provider: No ref. provider found  2023      Chief Complaint:   Left Trigeminal Neuralgia  S/p Left MVD on 2023   Post op follow-up     History of Present Illness:   Marilyn is a 48-year-old gentleman from [place].  He has a history of left-sided trigeminal neuralgia.  In the past this has been treated with Trileptal.  He has had a very severe exacerbation at this point in time medication is no longer working and he is seeking a surgical treatment. MRI demonstrating compression of the trigeminal nerve.  Microvascular decompression was thoroughly discussed with the patient by Dr. Gonzales and patient underwent this surgery on 2023.     Surgery was uneventful, but his postop stay was complicated by hyponatremia, which appeared to be chronic. Urine labs suggestive of SIADH, therefore patient started on 1.2 L fluid restriction. Sodium improved to 130. He was discharged from the hospital on 2023.     Today, patient presents for the post op evaluation with his wife, Amy. Overall, he has been recovering well. He reports no TN pain on the left side since surgery and is very happy with this. He continues to have some neck discomfort and plugged ear since surgery. No fever or chills. No concerns with surgical incision.     He is currently on trileptal 300 mg twice daily which is less than what he has been taking prior to surgery. He feels ready to gradually stop this medication. He had hyponatremia in the past and is scheduled for a follow-up appointment at his PCP's office next week.     Review of Systems:   Pertinent items are noted in HPI or as in patient entered ROS below, remainder of complete ROS is negative.        No data to display                 Physical Exam:   /84 (BP Location: Left arm, Patient Position: Sitting, Cuff Size: Adult Regular)    "Pulse 91   Temp 97.4  F (36.3  C)   Resp 16   Ht 1.753 m (5' 9\")   Wt 88.6 kg (195 lb 6.4 oz)   SpO2 99%   BMI 28.86 kg/m     General: No acute distress.    Neuro: The patient is fully oriented. Speech is normal.  Gait is normal.  Psych: Normal mood and affect. Behavior is normal.    Incision: Left MVD incision CDI. No redness or swelling.     Imaging:  No recent imaging.     Procedures:  Left MVD surgical sutures removed without any difficulty. Incision CDI.     Assessment:  Left Trigeminal Neuralgia  S/p Left MVD on 11/30/2023   Post op follow-up     Plan:  Patient no longer has any active TN symptoms since recent MVD. He is recovering well from the recent surgery. He is currently on trileptal 300 mg twice daily. We will plan to reduce this medication by 150 mg per week and then stop. Patient is planning to return to work in 2 weeks. He will follow-up with us as needed.        I spent 25 minutes on patient care activities related to this encounter on the date of service, including time spent reviewing the chart, obtaining history and examination and in counseling the patient, and in documentation in the electronic medical record.        Radha MULLINS CNP  Department of Neurosurgery    "

## 2023-12-13 NOTE — LETTER
2023       RE: Hong Kramer  [address] [address] [place] [id number]     Dear Colleague,    Thank you for referring your patient, Hong Kramer, to the [facility] [place] at [facility]. Please see a copy of my visit note below.    [facility]  Department of Neurosurgery      Name: Hong Kramer  MRN: [id number]  Age: 48 year old  : 1975  Referring provider: No ref. provider found  2023      Chief Complaint:   Left Trigeminal Neuralgia  S/p Left MVD on 2023   Post op follow-up     History of Present Illness:   Marilyn is a 48-year-old gentleman from [place].  He has a history of left-sided trigeminal neuralgia.  In the past this has been treated with Trileptal.  He has had a very severe exacerbation at this point in time medication is no longer working and he is seeking a surgical treatment. MRI demonstrating compression of the trigeminal nerve.  Microvascular decompression was thoroughly discussed with the patient by Dr. Gonzales and patient underwent this surgery on 2023.     Surgery was uneventful, but his postop stay was complicated by hyponatremia, which appeared to be chronic. Urine labs suggestive of SIADH, therefore patient started on 1.2 L fluid restriction. Sodium improved to 130. He was discharged from the hospital on 2023.     Today, patient presents for the post op evaluation with his wife, Amy. Overall, he has been recovering well. He reports no TN pain on the left side since surgery and is very happy with this. He continues to have some neck discomfort and plugged ear since surgery. No fever or chills. No concerns with surgical incision.     He is currently on trileptal 300 mg twice daily which is less than what he has been taking prior to surgery. He feels ready to gradually stop this medication. He had hyponatremia in the past and is scheduled for a follow-up appointment  "at his PCP's office next week.     Review of Systems:   Pertinent items are noted in HPI or as in patient entered ROS below, remainder of complete ROS is negative.        No data to display                 Physical Exam:   /84 (BP Location: Left arm, Patient Position: Sitting, Cuff Size: Adult Regular)   Pulse 91   Temp 97.4  F (36.3  C)   Resp 16   Ht 1.753 m (5' 9\")   Wt 88.6 kg (195 lb 6.4 oz)   SpO2 99%   BMI 28.86 kg/m     General: No acute distress.    Neuro: The patient is fully oriented. Speech is normal.  Gait is normal.  Psych: Normal mood and affect. Behavior is normal.    Incision: Left MVD incision CDI. No redness or swelling.     Imaging:  No recent imaging.     Procedures:  Left MVD surgical sutures removed without any difficulty. Incision CDI.     Assessment:  Left Trigeminal Neuralgia  S/p Left MVD on 11/30/2023   Post op follow-up     Plan:  Patient no longer has any active TN symptoms since recent MVD. He is recovering well from the recent surgery. He is currently on trileptal 300 mg twice daily. We will plan to reduce this medication by 150 mg per week and then stop. Patient is planning to return to work in 2 weeks. He will follow-up with us as needed.      I spent 25 minutes on patient care activities related to this encounter on the date of service, including time spent reviewing the chart, obtaining history and examination and in counseling the patient, and in documentation in the electronic medical record.    Again, thank you for allowing me to participate in the care of your patient.      Sincerely,    HAPRREET Mace CNP    "

## 2024-01-05 ENCOUNTER — MYC MEDICAL ADVICE (OUTPATIENT)
Dept: NEUROSURGERY | Facility: CLINIC | Age: 49
End: 2024-01-05
Payer: COMMERCIAL

## 2024-01-05 NOTE — LETTER
Patient:  Hong Kramer  :   1975  MRN:     [id number]      2024    Patient Name:  Hong Kramer    Physician: Jairo Gonzales MD     Hong may return to work on 1/10/24.      His next clinic appointment is scheduled as needed.      Patient Limitations:  None, may advance to Full Time without Restrictions.      Thank you,    Jody Kendall RN, BSN for   Jairo Gonzales MD  [facility]        [id number]  1975

## 2024-01-08 NOTE — TELEPHONE ENCOUNTER
RTW letter completed and sent via My Chart  RTW 1/10/24 full time as tolerated, no restrictions.

## 2024-01-08 NOTE — TELEPHONE ENCOUNTER
[facility] Call Center    Phone Message    May a detailed message be left on voicemail: yes     Reason for Call: Form or Letter   Type or form/letter needing completion: Return to work letter for 1/10/2024  Provider: Dr. Gonzales or HARPREET Banegas CNP  Date form needed: 1/10/2024 or ASAP per patient   Once completed: Shanique or email at [email or web address]     Action Taken: Message routed to:  [facility] Neurosurgery    Travel Screening: Not Applicable

## 2024-06-09 ENCOUNTER — HEALTH MAINTENANCE LETTER (OUTPATIENT)
Age: 49
End: 2024-06-09

## 2025-06-15 ENCOUNTER — HEALTH MAINTENANCE LETTER (OUTPATIENT)
Age: 50
End: 2025-06-15

## (undated) DEVICE — SU NUROLON 4-0 TF CR 8X18" C584D

## (undated) DEVICE — SPONGE SURGIFOAM 100 1974

## (undated) DEVICE — PREP CHLORAPREP CLEAR 3ML 930400

## (undated) DEVICE — OINTMENT ANTIBIOTIC BACITRACIN ZINC .9 G 1171

## (undated) DEVICE — ESU CORD BIPOLAR GREEN 10-4000

## (undated) DEVICE — CATH TRAY FOLEY SURESTEP 16FR W/TMP PRB STLK LATEX A319416AM

## (undated) DEVICE — PACK CRANIOTOMY

## (undated) DEVICE — SU VICRYL 2-0 CT-2 CR 8X18" J726D

## (undated) DEVICE — DRAPE SHEET REV FOLD 3/4 9349

## (undated) DEVICE — LABEL MEDICATION SYSTEM 3303-P

## (undated) DEVICE — SURGICEL HEMOSTAT 4X8" 1952

## (undated) DEVICE — PREP SKIN SCRUB TRAY 4461A

## (undated) DEVICE — SPONGE COTTONOID 1/2X1 1/2" 20-06S

## (undated) DEVICE — GLOVE BIOGEL PI MICRO SZ 7.5 48575

## (undated) DEVICE — SPONGE COTTONOID 1/2X3" 20-07S

## (undated) DEVICE — WIPES FOLEY CARE SURESTEP PROVON DFC100

## (undated) DEVICE — ESU CORD BIPOLAR AND IRR TUBING AESCULAP US355

## (undated) DEVICE — NDL ANGIOCATH 14GA 1.25" 4048

## (undated) DEVICE — SPONGE COTTONOID 1/4X1/4" 20-01S

## (undated) DEVICE — ESU GROUND PAD ADULT W/CORD E7507

## (undated) DEVICE — RX SURGIFLO HEMOSTATIC MATRIX W/THROMBIN 8ML 2994

## (undated) DEVICE — PREP POVIDONE-IODINE 10% SOLUTION 4OZ BOTTLE MDS093944

## (undated) DEVICE — DECANTER BAG 2002S

## (undated) DEVICE — SPONGE COTTONOID 1/2X1/2" 20-04S

## (undated) DEVICE — SUCTION MANIFOLD NEPTUNE 2 SYS 4 PORT 0702-020-000

## (undated) DEVICE — SYR 30ML LL W/O NDL 302832

## (undated) DEVICE — DRAPE CRANIOTOMY W/POUCH 9450

## (undated) DEVICE — IOM SUPPLIES/CASE FEE

## (undated) DEVICE — BUR STRK CARBIDE ROUND 5.0MM 5820-110-050C

## (undated) DEVICE — DRAPE MICROSCOPE LEICA 54X120" 09-MK653

## (undated) DEVICE — PAD CHUX UNDERPAD 23X24" 7136

## (undated) DEVICE — STRAP UNIVERSAL POSITIONING 2-PIECE 4X47X76" 91-287

## (undated) DEVICE — LINEN TOWEL PACK X30 5481

## (undated) DEVICE — DECANTER VIAL 2006S

## (undated) DEVICE — SPONGE COTTONOID 1X3" 20-10S

## (undated) DEVICE — STPL SKIN 35W ROTATING HEAD PRW35

## (undated) DEVICE — DRSG PRIMAPORE 03 1/8X6" 66000318

## (undated) DEVICE — BARD® PTFE FELT, 2.5 CM X 2.5 CM
Type: IMPLANTABLE DEVICE | Site: CRANIAL | Status: NON-FUNCTIONAL
Brand: BARD® PTFE FELT

## (undated) DEVICE — PREP POVIDONE-IODINE 7.5% SCRUB 4OZ BOTTLE MDS093945

## (undated) DEVICE — DRAPE POUCH INSTRUMENT 1018

## (undated) DEVICE — LINEN TOWEL PACK X6 WHITE 5487

## (undated) DEVICE — PIN SKULL MAYFIELD ADULT TITANIUM 3/PK A1120

## (undated) DEVICE — PACK GOWN 3/PK DISP XL SBA32GPFCB

## (undated) DEVICE — ESU FCP BIPOLAR 20CMX0.5MM SLIM TIP 9008050SL

## (undated) DEVICE — SOL RINGERS LACTATED 1000ML BAG 07953-09

## (undated) DEVICE — SU ETHILON 3-0 PS-1 18" 1663H

## (undated) DEVICE — DRSG KERLIX 4 1/2"X4YDS ROLL 6715

## (undated) DEVICE — BONE WAX 2.5GM W31G

## (undated) DEVICE — RETR ELASTIC STAYS LONE STAR BLUNT DUAL LEAD 3550-1G

## (undated) RX ORDER — SODIUM CHLORIDE 9 MG/ML
INJECTION, SOLUTION INTRAVENOUS
Status: DISPENSED
Start: 2023-11-30

## (undated) RX ORDER — ESMOLOL HYDROCHLORIDE 10 MG/ML
INJECTION INTRAVENOUS
Status: DISPENSED
Start: 2023-11-30

## (undated) RX ORDER — FENTANYL CITRATE 50 UG/ML
INJECTION, SOLUTION INTRAMUSCULAR; INTRAVENOUS
Status: DISPENSED
Start: 2023-11-30

## (undated) RX ORDER — PROPOFOL 10 MG/ML
INJECTION, EMULSION INTRAVENOUS
Status: DISPENSED
Start: 2023-11-30

## (undated) RX ORDER — DEXAMETHASONE SODIUM PHOSPHATE 4 MG/ML
INJECTION, SOLUTION INTRA-ARTICULAR; INTRALESIONAL; INTRAMUSCULAR; INTRAVENOUS; SOFT TISSUE
Status: DISPENSED
Start: 2023-11-30

## (undated) RX ORDER — FENTANYL CITRATE-0.9 % NACL/PF 10 MCG/ML
PLASTIC BAG, INJECTION (ML) INTRAVENOUS
Status: DISPENSED
Start: 2023-11-30

## (undated) RX ORDER — ONDANSETRON 2 MG/ML
INJECTION INTRAMUSCULAR; INTRAVENOUS
Status: DISPENSED
Start: 2023-11-30

## (undated) RX ORDER — PAPAVERINE HYDROCHLORIDE 30 MG/ML
INJECTION INTRAMUSCULAR; INTRAVENOUS
Status: DISPENSED
Start: 2023-11-30

## (undated) RX ORDER — HYDROMORPHONE HYDROCHLORIDE 1 MG/ML
INJECTION, SOLUTION INTRAMUSCULAR; INTRAVENOUS; SUBCUTANEOUS
Status: DISPENSED
Start: 2023-11-30

## (undated) RX ORDER — ACETAMINOPHEN 325 MG/1
TABLET ORAL
Status: DISPENSED
Start: 2023-11-30

## (undated) RX ORDER — GLYCOPYRROLATE 0.2 MG/ML
INJECTION, SOLUTION INTRAMUSCULAR; INTRAVENOUS
Status: DISPENSED
Start: 2023-11-30

## (undated) RX ORDER — CEFAZOLIN SODIUM/WATER 2 G/20 ML
SYRINGE (ML) INTRAVENOUS
Status: DISPENSED
Start: 2023-11-30